# Patient Record
Sex: MALE | Race: BLACK OR AFRICAN AMERICAN | NOT HISPANIC OR LATINO
[De-identification: names, ages, dates, MRNs, and addresses within clinical notes are randomized per-mention and may not be internally consistent; named-entity substitution may affect disease eponyms.]

---

## 2017-07-24 ENCOUNTER — APPOINTMENT (OUTPATIENT)
Dept: INTERNAL MEDICINE | Facility: CLINIC | Age: 57
End: 2017-07-24

## 2017-07-24 VITALS
TEMPERATURE: 97.9 F | OXYGEN SATURATION: 98 % | SYSTOLIC BLOOD PRESSURE: 118 MMHG | WEIGHT: 217 LBS | HEART RATE: 52 BPM | RESPIRATION RATE: 12 BRPM | DIASTOLIC BLOOD PRESSURE: 76 MMHG

## 2017-07-24 DIAGNOSIS — Z78.9 OTHER SPECIFIED HEALTH STATUS: ICD-10-CM

## 2017-07-24 DIAGNOSIS — Z83.3 FAMILY HISTORY OF DIABETES MELLITUS: ICD-10-CM

## 2017-07-24 DIAGNOSIS — Z82.49 FAMILY HISTORY OF ISCHEMIC HEART DISEASE AND OTHER DISEASES OF THE CIRCULATORY SYSTEM: ICD-10-CM

## 2017-07-25 LAB
ALBUMIN SERPL ELPH-MCNC: 4.3 G/DL
ALP BLD-CCNC: 51 U/L
ALT SERPL-CCNC: 13 U/L
ANION GAP SERPL CALC-SCNC: 14 MMOL/L
APPEARANCE: CLEAR
AST SERPL-CCNC: 21 U/L
BACTERIA: NEGATIVE
BASOPHILS # BLD AUTO: 0.02 K/UL
BASOPHILS NFR BLD AUTO: 0.5 %
BILIRUB SERPL-MCNC: 0.4 MG/DL
BILIRUBIN URINE: NEGATIVE
BLOOD URINE: ABNORMAL
BUN SERPL-MCNC: 14 MG/DL
CALCIUM SERPL-MCNC: 10 MG/DL
CHLORIDE SERPL-SCNC: 103 MMOL/L
CHOLEST SERPL-MCNC: 221 MG/DL
CHOLEST/HDLC SERPL: 3.5 RATIO
CO2 SERPL-SCNC: 26 MMOL/L
COLOR: YELLOW
CREAT SERPL-MCNC: 1.16 MG/DL
EOSINOPHIL # BLD AUTO: 0.16 K/UL
EOSINOPHIL NFR BLD AUTO: 4.2 %
GLUCOSE QUALITATIVE U: NORMAL MG/DL
GLUCOSE SERPL-MCNC: 94 MG/DL
HBA1C MFR BLD HPLC: 5.9 %
HCT VFR BLD CALC: 41 %
HDLC SERPL-MCNC: 64 MG/DL
HGB BLD-MCNC: 13.1 G/DL
HYALINE CASTS: 0 /LPF
IMM GRANULOCYTES NFR BLD AUTO: 0.3 %
KETONES URINE: NEGATIVE
LDLC SERPL CALC-MCNC: 143 MG/DL
LEUKOCYTE ESTERASE URINE: NEGATIVE
LYMPHOCYTES # BLD AUTO: 1.68 K/UL
LYMPHOCYTES NFR BLD AUTO: 43.9 %
MAN DIFF?: NORMAL
MCHC RBC-ENTMCNC: 28.1 PG
MCHC RBC-ENTMCNC: 32 GM/DL
MCV RBC AUTO: 87.8 FL
MICROSCOPIC-UA: NORMAL
MONOCYTES # BLD AUTO: 0.28 K/UL
MONOCYTES NFR BLD AUTO: 7.3 %
NEUTROPHILS # BLD AUTO: 1.68 K/UL
NEUTROPHILS NFR BLD AUTO: 43.8 %
NITRITE URINE: NEGATIVE
PH URINE: 5.5
PLATELET # BLD AUTO: 127 K/UL
POTASSIUM SERPL-SCNC: 4.4 MMOL/L
PROT SERPL-MCNC: 7.4 G/DL
PROTEIN URINE: NEGATIVE MG/DL
PSA SERPL-MCNC: 0.89 NG/ML
RBC # BLD: 4.67 M/UL
RBC # FLD: 15 %
RED BLOOD CELLS URINE: 1 /HPF
SODIUM SERPL-SCNC: 143 MMOL/L
SPECIFIC GRAVITY URINE: 1.01
SQUAMOUS EPITHELIAL CELLS: 0 /HPF
TRIGL SERPL-MCNC: 71 MG/DL
TSH SERPL-ACNC: 1.49 UIU/ML
UROBILINOGEN URINE: NORMAL MG/DL
WBC # FLD AUTO: 3.83 K/UL
WHITE BLOOD CELLS URINE: 0 /HPF

## 2017-08-15 ENCOUNTER — FORM ENCOUNTER (OUTPATIENT)
Age: 57
End: 2017-08-15

## 2017-08-16 ENCOUNTER — OUTPATIENT (OUTPATIENT)
Dept: OUTPATIENT SERVICES | Facility: HOSPITAL | Age: 57
LOS: 1 days | End: 2017-08-16
Payer: COMMERCIAL

## 2017-08-16 ENCOUNTER — APPOINTMENT (OUTPATIENT)
Dept: ORTHOPEDIC SURGERY | Facility: CLINIC | Age: 57
End: 2017-08-16
Payer: COMMERCIAL

## 2017-08-16 DIAGNOSIS — M25.562 PAIN IN LEFT KNEE: ICD-10-CM

## 2017-08-16 PROCEDURE — 73564 X-RAY EXAM KNEE 4 OR MORE: CPT

## 2017-08-16 PROCEDURE — 73564 X-RAY EXAM KNEE 4 OR MORE: CPT | Mod: 26,LT

## 2017-08-16 PROCEDURE — 99204 OFFICE O/P NEW MOD 45 MIN: CPT

## 2017-08-17 ENCOUNTER — FORM ENCOUNTER (OUTPATIENT)
Age: 57
End: 2017-08-17

## 2017-08-18 ENCOUNTER — APPOINTMENT (OUTPATIENT)
Dept: ORTHOPEDIC SURGERY | Facility: CLINIC | Age: 57
End: 2017-08-18
Payer: COMMERCIAL

## 2017-08-18 ENCOUNTER — OUTPATIENT (OUTPATIENT)
Dept: OUTPATIENT SERVICES | Facility: HOSPITAL | Age: 57
LOS: 1 days | End: 2017-08-18
Payer: COMMERCIAL

## 2017-08-18 DIAGNOSIS — M25.571 PAIN IN RIGHT ANKLE AND JOINTS OF RIGHT FOOT: ICD-10-CM

## 2017-08-18 DIAGNOSIS — M77.51 OTHER ENTHESOPATHY OF RT FOOT AND ANKLE: ICD-10-CM

## 2017-08-18 PROCEDURE — 99214 OFFICE O/P EST MOD 30 MIN: CPT

## 2017-08-18 PROCEDURE — 73610 X-RAY EXAM OF ANKLE: CPT

## 2017-08-18 PROCEDURE — 73610 X-RAY EXAM OF ANKLE: CPT | Mod: 26,RT

## 2017-08-25 ENCOUNTER — TRANSCRIPTION ENCOUNTER (OUTPATIENT)
Age: 57
End: 2017-08-25

## 2017-09-08 ENCOUNTER — APPOINTMENT (OUTPATIENT)
Dept: ORTHOPEDIC SURGERY | Facility: CLINIC | Age: 57
End: 2017-09-08

## 2017-11-08 ENCOUNTER — APPOINTMENT (OUTPATIENT)
Dept: GASTROENTEROLOGY | Facility: CLINIC | Age: 57
End: 2017-11-08
Payer: COMMERCIAL

## 2017-11-08 ENCOUNTER — LABORATORY RESULT (OUTPATIENT)
Age: 57
End: 2017-11-08

## 2017-11-08 VITALS
DIASTOLIC BLOOD PRESSURE: 86 MMHG | SYSTOLIC BLOOD PRESSURE: 122 MMHG | WEIGHT: 213 LBS | RESPIRATION RATE: 14 BRPM | HEART RATE: 52 BPM | BODY MASS INDEX: 28.85 KG/M2 | OXYGEN SATURATION: 98 % | HEIGHT: 72 IN | TEMPERATURE: 98.4 F

## 2017-11-08 PROCEDURE — 99243 OFF/OP CNSLTJ NEW/EST LOW 30: CPT | Mod: 25,GC

## 2017-11-08 PROCEDURE — 36415 COLL VENOUS BLD VENIPUNCTURE: CPT | Mod: GC

## 2017-11-09 LAB
ALBUMIN SERPL ELPH-MCNC: 4.4 G/DL
ALP BLD-CCNC: 44 U/L
ALT SERPL-CCNC: 10 U/L
ANION GAP SERPL CALC-SCNC: 14 MMOL/L
AST SERPL-CCNC: 28 U/L
BASOPHILS # BLD AUTO: 0.01 K/UL
BASOPHILS NFR BLD AUTO: 0.3 %
BILIRUB SERPL-MCNC: 0.3 MG/DL
BUN SERPL-MCNC: 14 MG/DL
CALCIUM SERPL-MCNC: 9.6 MG/DL
CHLORIDE SERPL-SCNC: 102 MMOL/L
CO2 SERPL-SCNC: 27 MMOL/L
CREAT SERPL-MCNC: 1.21 MG/DL
EOSINOPHIL # BLD AUTO: 0.17 K/UL
EOSINOPHIL NFR BLD AUTO: 4.4 %
GLUCOSE SERPL-MCNC: 103 MG/DL
HCT VFR BLD CALC: 43.5 %
HGB BLD-MCNC: 14.2 G/DL
IMM GRANULOCYTES NFR BLD AUTO: 0 %
LYMPHOCYTES # BLD AUTO: 1.47 K/UL
LYMPHOCYTES NFR BLD AUTO: 38.1 %
MAN DIFF?: NORMAL
MCHC RBC-ENTMCNC: 28.9 PG
MCHC RBC-ENTMCNC: 32.6 GM/DL
MCV RBC AUTO: 88.4 FL
MONOCYTES # BLD AUTO: 0.34 K/UL
MONOCYTES NFR BLD AUTO: 8.8 %
NEUTROPHILS # BLD AUTO: 1.87 K/UL
NEUTROPHILS NFR BLD AUTO: 48.4 %
PLATELET # BLD AUTO: 115 K/UL
POTASSIUM SERPL-SCNC: 4.2 MMOL/L
PROT SERPL-MCNC: 7.8 G/DL
RBC # BLD: 4.92 M/UL
RBC # FLD: 14.9 %
SODIUM SERPL-SCNC: 143 MMOL/L
WBC # FLD AUTO: 3.86 K/UL

## 2017-12-05 ENCOUNTER — OTHER (OUTPATIENT)
Age: 57
End: 2017-12-05

## 2017-12-21 ENCOUNTER — RESULT REVIEW (OUTPATIENT)
Age: 57
End: 2017-12-21

## 2017-12-21 ENCOUNTER — APPOINTMENT (OUTPATIENT)
Dept: GASTROENTEROLOGY | Facility: HOSPITAL | Age: 57
End: 2017-12-21

## 2017-12-21 ENCOUNTER — OUTPATIENT (OUTPATIENT)
Dept: OUTPATIENT SERVICES | Facility: HOSPITAL | Age: 57
LOS: 1 days | Discharge: HOME | End: 2017-12-21
Payer: COMMERCIAL

## 2017-12-21 PROCEDURE — 45385 COLONOSCOPY W/LESION REMOVAL: CPT | Mod: PT

## 2017-12-21 PROCEDURE — 45385 COLONOSCOPY W/LESION REMOVAL: CPT

## 2017-12-21 PROCEDURE — 45380 COLONOSCOPY AND BIOPSY: CPT | Mod: PT,XS

## 2017-12-21 PROCEDURE — C1889: CPT

## 2017-12-21 PROCEDURE — 88305 TISSUE EXAM BY PATHOLOGIST: CPT

## 2017-12-22 LAB — SURGICAL PATHOLOGY STUDY: SIGNIFICANT CHANGE UP

## 2018-01-02 ENCOUNTER — MESSAGE (OUTPATIENT)
Age: 58
End: 2018-01-02

## 2022-05-02 ENCOUNTER — APPOINTMENT (OUTPATIENT)
Dept: ORTHOPEDIC SURGERY | Facility: CLINIC | Age: 62
End: 2022-05-02
Payer: MEDICAID

## 2022-05-02 VITALS — BODY MASS INDEX: 28.85 KG/M2 | RESPIRATION RATE: 16 BRPM | WEIGHT: 213 LBS | HEIGHT: 72 IN

## 2022-05-02 DIAGNOSIS — Q68.2 CONGENITAL DEFORMITY OF KNEE: ICD-10-CM

## 2022-05-02 DIAGNOSIS — M17.12 UNILATERAL PRIMARY OSTEOARTHRITIS, LEFT KNEE: ICD-10-CM

## 2022-05-02 DIAGNOSIS — Z78.9 OTHER SPECIFIED HEALTH STATUS: ICD-10-CM

## 2022-05-02 PROCEDURE — 73564 X-RAY EXAM KNEE 4 OR MORE: CPT | Mod: LT

## 2022-05-02 PROCEDURE — 99203 OFFICE O/P NEW LOW 30 MIN: CPT

## 2022-05-02 RX ORDER — IBUPROFEN 800 MG/1
800 TABLET, FILM COATED ORAL
Qty: 16 | Refills: 0 | Status: ACTIVE | COMMUNITY
Start: 2022-04-21

## 2022-08-11 ENCOUNTER — EMERGENCY (EMERGENCY)
Facility: HOSPITAL | Age: 62
LOS: 1 days | Discharge: HOME | End: 2022-08-11
Admitting: EMERGENCY MEDICINE
Payer: COMMERCIAL

## 2022-08-11 VITALS
WEIGHT: 210.1 LBS | HEART RATE: 55 BPM | TEMPERATURE: 99 F | DIASTOLIC BLOOD PRESSURE: 70 MMHG | OXYGEN SATURATION: 95 % | SYSTOLIC BLOOD PRESSURE: 121 MMHG | RESPIRATION RATE: 18 BRPM

## 2022-08-11 LAB
APPEARANCE UR: CLEAR — SIGNIFICANT CHANGE UP
BILIRUB UR-MCNC: NEGATIVE — SIGNIFICANT CHANGE UP
COLOR SPEC: YELLOW — SIGNIFICANT CHANGE UP
DIFF PNL FLD: NEGATIVE — SIGNIFICANT CHANGE UP
GLUCOSE UR QL: NEGATIVE — SIGNIFICANT CHANGE UP
KETONES UR-MCNC: NEGATIVE — SIGNIFICANT CHANGE UP
LEUKOCYTE ESTERASE UR-ACNC: NEGATIVE — SIGNIFICANT CHANGE UP
NITRITE UR-MCNC: NEGATIVE — SIGNIFICANT CHANGE UP
PH UR: 6 — SIGNIFICANT CHANGE UP (ref 5–8)
PROT UR-MCNC: NEGATIVE MG/DL — SIGNIFICANT CHANGE UP
SP GR SPEC: 1.02 — SIGNIFICANT CHANGE UP (ref 1–1.03)
UROBILINOGEN FLD QL: 0.2 E.U./DL — SIGNIFICANT CHANGE UP

## 2022-08-11 PROCEDURE — 99283 EMERGENCY DEPT VISIT LOW MDM: CPT

## 2022-08-11 PROCEDURE — 81003 URINALYSIS AUTO W/O SCOPE: CPT

## 2022-08-11 PROCEDURE — 99284 EMERGENCY DEPT VISIT MOD MDM: CPT

## 2022-08-11 RX ORDER — LIDOCAINE 4 G/100G
1 CREAM TOPICAL ONCE
Refills: 0 | Status: COMPLETED | OUTPATIENT
Start: 2022-08-11 | End: 2022-08-11

## 2022-08-11 RX ORDER — DICLOFENAC SODIUM 75 MG/1
1 TABLET, DELAYED RELEASE ORAL
Qty: 15 | Refills: 0
Start: 2022-08-11 | End: 2022-08-15

## 2022-08-11 RX ORDER — CYCLOBENZAPRINE HYDROCHLORIDE 10 MG/1
10 TABLET, FILM COATED ORAL ONCE
Refills: 0 | Status: COMPLETED | OUTPATIENT
Start: 2022-08-11 | End: 2022-08-11

## 2022-08-11 RX ORDER — CYCLOBENZAPRINE HYDROCHLORIDE 10 MG/1
1 TABLET, FILM COATED ORAL
Qty: 15 | Refills: 0
Start: 2022-08-11 | End: 2022-08-15

## 2022-08-11 RX ORDER — LIDOCAINE 4 G/100G
1 CREAM TOPICAL
Qty: 7 | Refills: 0
Start: 2022-08-11 | End: 2022-08-17

## 2022-08-11 RX ADMIN — LIDOCAINE 1 PATCH: 4 CREAM TOPICAL at 18:54

## 2022-08-11 RX ADMIN — CYCLOBENZAPRINE HYDROCHLORIDE 10 MILLIGRAM(S): 10 TABLET, FILM COATED ORAL at 18:54

## 2022-08-11 NOTE — ED PROVIDER NOTE - MUSCULOSKELETAL, MLM
no discolorations, no rash, no spinal tend, FROM, + discomfort over L para upper lumbar area, normal gait, UE/LE w/FROM x 4

## 2022-08-11 NOTE — ED PROVIDER NOTE - PATIENT PORTAL LINK FT
You can access the FollowMyHealth Patient Portal offered by [facility] by registering at the following website: [email or web address]. By joining [employer]’s FollowMyHealth portal, you will also be able to view your health information using other applications (apps) compatible with our system.

## 2022-08-11 NOTE — ED PROVIDER NOTE - CLINICAL SUMMARY MEDICAL DECISION MAKING FREE TEXT BOX
pt c/o l sided back pain s/p martial arts class, states that pain is improving, but still having soreness - worse w/certain mov and positions, no blunt trauma or fall, no spinal tend, no concern for cord compression or cauda equina, not renal colic by hx, suspect msk / spasm, will rx muscle relaxants and nsaids and lidoderm, to rest and f/u w/pmd, no emergent indication for any imaging at this time, pt understands and agrees w/plan, strict return precautions given

## 2022-08-11 NOTE — ED PROVIDER NOTE - NSFOLLOWUPINSTRUCTIONS_ED_ALL_ED_FT
Back Pain  rest, avoid training, heavy lifting/pushing/pulling, take medication as discussed, follow up with your pmd  Back pain is very common in adults. The cause of back pain is rarely dangerous and the pain often gets better over time. The cause of your back pain may not be known and may include strain of muscles or ligaments, degeneration of the spinal disks, or arthritis. Occasionally the pain may radiate down your leg(s). Over-the-counter medicines to reduce pain and inflammation are often the most helpful. Stretching and remaining active frequently helps the healing process.     SEEK IMMEDIATE MEDICAL CARE IF YOU HAVE ANY OF THE FOLLOWING SYMPTOMS: bowel or bladder control problems, unusual weakness or numbness in your arms or legs, nausea or vomiting, abdominal pain, fever, dizziness/lightheadedness.

## 2022-08-11 NOTE — ED PROVIDER NOTE - OBJECTIVE STATEMENT
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 Pain to L mid back, 4/10, aggravated w/mov and certain positions, took an ibuprofen w/good relief. Denies blunt trauma, fall, numbness or tingling to fingers or toes, loss of bowel or bladder control, fevers, chills, cp, sob, n/v/d, abd pain .

## 2022-08-11 NOTE — ED ADULT NURSE NOTE - OBJECTIVE STATEMENT
Pt A&Ox4, ambulatory with steady gait, speaking in clear/full sentences, no acute distress, vital signs stable. Pt presents to the ED for left sided back pain x 3 days. Pain began after exercise and pt felt like the pain was "gas." Pt wants to ensure that back pain is not related to kidneys. No CVA tenderness, no burning urination, or dysuria. No numbness/tingling or loss of control of bowel/bladder.

## 2022-08-11 NOTE — ED ADULT TRIAGE NOTE - CHIEF COMPLAINT QUOTE
Patient c/o left lower back pain that started after [employer] class 1 week ago.  Denies urinary symptoms, numbness, incontinence.

## 2022-08-15 DIAGNOSIS — M54.6 PAIN IN THORACIC SPINE: ICD-10-CM

## 2022-08-15 DIAGNOSIS — M54.50 LOW BACK PAIN, UNSPECIFIED: ICD-10-CM
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
## 2023-01-10 ENCOUNTER — EMERGENCY (EMERGENCY)
Facility: HOSPITAL | Age: 63
LOS: 1 days | Discharge: HOME | End: 2023-01-10
Attending: STUDENT IN AN ORGANIZED HEALTH CARE EDUCATION/TRAINING PROGRAM | Admitting: STUDENT IN AN ORGANIZED HEALTH CARE EDUCATION/TRAINING PROGRAM
Payer: COMMERCIAL

## 2023-01-10 VITALS
HEIGHT: 72 IN | DIASTOLIC BLOOD PRESSURE: 64 MMHG | OXYGEN SATURATION: 97 % | TEMPERATURE: 98 F | RESPIRATION RATE: 18 BRPM | WEIGHT: 210.1 LBS | HEART RATE: 58 BPM | SYSTOLIC BLOOD PRESSURE: 129 MMHG

## 2023-01-10 PROCEDURE — 99284 EMERGENCY DEPT VISIT MOD MDM: CPT

## 2023-01-10 PROCEDURE — 99283 EMERGENCY DEPT VISIT LOW MDM: CPT

## 2023-01-10 RX ORDER — CIPROFLOXACIN AND DEXAMETHASONE 3; 1 MG/ML; MG/ML
4 SUSPENSION/ DROPS AURICULAR (OTIC)
Qty: 1 | Refills: 0
Start: 2023-01-10

## 2023-01-10 NOTE — ED PROVIDER NOTE - CLINICAL SUMMARY MEDICAL DECISION MAKING FREE TEXT BOX
56 yo M presenting with L ear fullness, itchiness, exam with mild erythema of auditory canal with debris but otherwise nonfocal, TM intact, nonbulging, no erythema, no mastoid tenderness. Will treat for possible otitis externa with ENT follow up.

## 2023-01-10 NOTE — ED PROVIDER NOTE - NSFOLLOWUPINSTRUCTIONS_ED_ALL_ED_FT
Please use ear drops as prescribed. Please return for worsening symptoms. Please follow up with the ENT doctor as needed.

## 2023-01-10 NOTE — ED PROVIDER NOTE - NS ED ROS FT
Constitutional: No fever or chills  Eyes: No discharge or drainage  Ears, Nose, Mouth, Throat: No nasal discharge, no sore throat, + L ear fullness and itchines  Cardiovascular: No chest pain, no palpitations  Respiratory: No shortness of breath, no cough  Gastrointestinal: No nausea or vomiting, no abdominal pain, no diarrhea or constipation  Musculoskeletal: No joint pain, no swelling  Skin: No rashes or lesions  Neurological: No numbness, weakness, tingling, no headache

## 2023-01-10 NOTE — ED PROVIDER NOTE - OBJECTIVE STATEMENT
62-year-old male with no significant past medical history presenting with left ear fullness and decreased hearing times several days.  Patient states he had a viral URI 2 weeks ago that resolved.  Patient states his ear feels full, also itchy.  Denies fever or chills, denies discharge or drainage, denies trauma, denies rhinorrhea or nasal congestion, denies cough, denies headache, no other complaints.  Has not taken anything for symptoms.  ROS as above.

## 2023-01-10 NOTE — ED ADULT NURSE NOTE - OBJECTIVE STATEMENT
Pt received aaox3 c/o left ear fullness and decreased hearing times several days. Patient states he had a viral URI 2 weeks ago that resolved.  Patient states his ear feels full, also itchy. Denies fever or chills, denies discharge or drainage, denies trauma, denies rhinorrhea or nasal congestion, denies cough, denies headache, no other complaints.

## 2023-01-10 NOTE — ED PROVIDER NOTE - PATIENT PORTAL LINK FT
You can access the FollowMyHealth Patient Portal offered by [facility] by registering at the following website: [email or web address]. By joining [employer]’s FollowMyHealth portal, you will also be able to view your health information using other applications (apps) compatible with our system.

## 2023-01-10 NOTE — ED PROVIDER NOTE - PHYSICAL EXAMINATION
General: Well appearing, in no acute distress  HEENT: Normocephalic, atraumatic, extraocular movements intact, R TM nonerythematous, L TM nonerythematous, L external auditory canal with debris, mild erythema, no discharge or drainage, no mastoid tenderness  CV: Regular rate  Pulm: No respiratory distress, no tachpynea  Abd: Flat, no gross distension  Ext: warm and well perfused  Skin: No gross rashes or lesions  Neuro: Alert and oriented, moving all extremities

## 2023-01-11 DIAGNOSIS — L29.9 PRURITUS, UNSPECIFIED: ICD-10-CM

## 2023-01-11 DIAGNOSIS — H60.92 UNSPECIFIED OTITIS EXTERNA, LEFT EAR: ICD-10-CM

## 2023-01-11 DIAGNOSIS — Z88.0 ALLERGY STATUS TO PENICILLIN: ICD-10-CM

## 2023-01-21 ENCOUNTER — EMERGENCY (EMERGENCY)
Facility: HOSPITAL | Age: 63
LOS: 1 days | Discharge: HOME | End: 2023-01-21
Attending: EMERGENCY MEDICINE | Admitting: EMERGENCY MEDICINE
Payer: COMMERCIAL

## 2023-01-21 VITALS
SYSTOLIC BLOOD PRESSURE: 138 MMHG | HEART RATE: 71 BPM | HEIGHT: 72 IN | DIASTOLIC BLOOD PRESSURE: 72 MMHG | TEMPERATURE: 98 F | RESPIRATION RATE: 16 BRPM | OXYGEN SATURATION: 99 % | WEIGHT: 210.1 LBS

## 2023-01-21 PROBLEM — Z78.9 OTHER SPECIFIED HEALTH STATUS: Chronic | Status: ACTIVE | Noted: 2023-01-10

## 2023-01-21 PROCEDURE — 99283 EMERGENCY DEPT VISIT LOW MDM: CPT

## 2023-01-21 RX ORDER — CLINDAMYCIN PHOSPHATE GEL USP, 1% 10 MG/G
1 GEL TOPICAL
Qty: 60 | Refills: 0
Start: 2023-01-21

## 2023-01-21 NOTE — ED PROVIDER NOTE - PATIENT PORTAL LINK FT
You can access the FollowMyHealth Patient Portal offered by [facility] by registering at the following website: [email or web address]. By joining [employer]’s FollowMyHealth portal, you will also be able to view your health information using other applications (apps) compatible with our system.

## 2023-01-21 NOTE — ED PROVIDER NOTE - CLINICAL SUMMARY MEDICAL DECISION MAKING FREE TEXT BOX
Pt c/o 1 yr intermittent sx scalp, groin, feet.  Scalp exam suggestive of folliculitis - will try topical abx.  Groin/feet c/w tinea - will try antifungal.  Recommend pt fu w derm.

## 2023-01-21 NOTE — ED ADULT NURSE NOTE - CHPI ED NUR SYMPTOMS NEG
no body aches/no chills/no confusion/no decreased eating/drinking/no fever/no inflammation/no pain/no petechia/no vomiting

## 2023-01-21 NOTE — ED ADULT TRIAGE NOTE - ADDITIONAL COMPLAINTS
[Takes medication as prescribed] : takes [None] : Patient does not have any barriers to medication adherence [Yes] : Reviewed medication list for presence of high-risk medications. [Other____] : [unfilled] Additional Complaints

## 2023-01-21 NOTE — ED PROVIDER NOTE - NSFOLLOWUPINSTRUCTIONS_ED_ALL_ED_FT
Folliculitis  Tinea pedis and cruris    Try clindamycin solution twice a day on your scalp lesions when they occur.    Use the antifungal medication twice a day EVERY day on affected groin and feet areas; make sure to get between your toes.  Wear clean, dry socks.     Follow up with dermatology.  You may call our referrals coordinator at [phone] Monday to Friday 11am-7pm for assistance with making an appointment     Tinea    Tinea is an infection of the skin caused by funguses. It can present in various areas of the body including the head (tinea capitis), body (tinea corporis or ringworm), groin (tinea cruris or jock itch), and foot (tinea pedis or athlete’s foot). Symptoms include an itchy red rash that may be ring-shaped and have central clearing and scales. Treatment may involve an antifungal cream or medicines by mouth.     SEEK IMMEDIATE MEDICAL CARE IF YOU HAVE ANY OF THE FOLLOWING SYMPTOMS: rash continues to spread, rash lasts over 4 weeks, or worsening warmth, tenderness, or swelling.  ---    Folliculitis    A normal hair follicle compared to one that is infected. The infected follicle is swollen and contains pus.   Folliculitis is inflammation of the hair follicles. Folliculitis most commonly occurs on the scalp, thighs, legs, back, and buttocks. However, it can occur anywhere on the body.      What are the causes?    This condition may be caused by:  •A bacterial infection (common).      •A fungal infection.      •A viral infection.      •Contact with certain chemicals, especially oils and tars.      •Shaving or waxing.      •Greasy ointments or creams applied to the skin.      Long-lasting folliculitis and folliculitis that keeps coming back may be caused by bacteria. This bacteria can live anywhere on your skin and is often found in the nostrils.      What increases the risk?    You are more likely to develop this condition if you have:  •A weakened immune system.      •Diabetes.      •Obesity.        What are the signs or symptoms?    Symptoms of this condition include:  •Redness.      •Soreness.      •Swelling.      •Itching.      •Small white or yellow, pus-filled, itchy spots (pustules) that appear over a reddened area. If there is an infection that goes deep into the follicle, these may develop into a boil (furuncle).      •A group of closely packed boils (carbuncle). These tend to form in hairy, sweaty areas of the body.        How is this diagnosed?    This condition is diagnosed with a skin exam. To find what is causing the condition, your health care provider may take a sample of one of the pustules or boils for testing in a lab.      How is this treated?    This condition may be treated by:  •Applying warm compresses to the affected areas.      •Taking an antibiotic medicine or applying an antibiotic medicine to the skin.      •Applying or bathing with an antiseptic solution.      •Taking an over-the-counter medicine to help with itching.      •Having a procedure to drain any pustules or boils. This may be done if a pustule or boil contains a lot of pus or fluid.      •Having laser hair removal. This may be done to treat long-lasting folliculitis.        Follow these instructions at home:      Managing pain and swelling   A heating pad for use on the affected area. •If directed, apply heat to the affected area as often as told by your health care provider. Use the heat source that your health care provider recommends, such as a moist heat pack or a heating pad.  •Place a towel between your skin and the heat source.      •Leave the heat on for 20–30 minutes.      •Remove the heat if your skin turns bright red. This is especially important if you are unable to feel pain, heat, or cold. You may have a greater risk of getting burned.        General instructions     •If you were prescribed an antibiotic medicine, take it or apply it as told by your health care provider. Do not stop using the antibiotic even if your condition improves.    •Check the irritated area every day for signs of infection. Check for:  •Redness, swelling, or pain.      •Fluid or blood.      •Warmth.      •Pus or a bad smell.        • Do not shave irritated skin.      •Take over-the-counter and prescription medicines only as told by your health care provider.      •Keep all follow-up visits as told by your health care provider. This is important.        Get help right away if:    •You have more redness, swelling, or pain in the affected area.      •Red streaks are spreading from the affected area.      •You have a fever.        Summary    •Folliculitis is inflammation of the hair follicles. Folliculitis most commonly occurs on the scalp, thighs, legs, back, and buttocks.      •This condition may be treated by taking an antibiotic medicine or applying an antibiotic medicine to the skin, and applying or bathing with an antiseptic solution.      •If you were prescribed an antibiotic medicine, take it or apply it as told by your health care provider. Do not stop using the antibiotic even if your condition improves.      •Get help right away if you have new or worsening symptoms.      •Keep all follow-up visits as told by your health care provider. This is important.

## 2023-01-21 NOTE — ED PROVIDER NOTE - OBJECTIVE STATEMENT
61 yo M c/o 1 yr intermittent itching in groin and bilat feet as well as lesions on his scalp.  Scalp lesions are itchy and have white material that he sometimes expresses and then they form a small wound that resolves.  No pain.  Pt used to have similar on his face but this stopped when he grew a beard.  Pt notes itching in groin/bilat feet; does have some improvement w lotrimin spray but does not use regularly.  No fever.  Pt has not seen derm for sx.

## 2023-01-21 NOTE — ED ADULT NURSE NOTE - OBJECTIVE STATEMENT
Patient came to ER with c/o rash to groin, feet and scalp that has been intermittent for about 1 year.

## 2023-01-21 NOTE — ED PROVIDER NOTE - PHYSICAL EXAMINATION
VITAL SIGNS: I have reviewed nursing notes and confirm.  CONSTITUTIONAL:  in no acute distress.   SKIN:  warm and dry, no acute rash.   HEAD:  normocephalic, atraumatic.  R scalp w 3 scattered 3 mm small wounds w/o ttp, erythema, warmth, drainage  EYES:  conjunctiva and sclera clear.  ENT: No nasal discharge; airway clear.   NECK: Supple; non tender.   RESP:  nl resp effort   SKIN: groin area w dry appearing skin, slightly hyperpigemented; bilat feet w dry patches on soles and also tinea changes bt all toes  NEURO: Alert, oriented, grossly unremarkable  PSYCH: Cooperative, mood and affect appropriate.

## 2023-01-21 NOTE — ED PROVIDER NOTE - CARE PLAN
1 Principal Discharge DX:	Folliculitis  Secondary Diagnosis:	Tinea pedis  Secondary Diagnosis:	Tinea cruris

## 2023-01-21 NOTE — ED ADULT NURSE NOTE - PAIN RATING/NUMBER SCALE (0-10): ACTIVITY
0 Ear Infection:  Take full course of antibiotics as prescribed.  Humidifier use at home.  Warm compresses to affected ear  Elevate head on a pillow at night   Flonase Nasal Spray as directed for nasal congestion  Over the Counter Claritin or Zyrtec (plain) as directed for allergy symptoms  Tylenol or Motrin every 4 - 6 hours as needed for fever or ear pain.  Follow up with your PCP in 1 week of initiating antibiotics or sooner for no improvement in symptoms  Follow up in the ER for any worsening of symptoms such as new fever, increasing ear pain, neck stiffness, shortness of breath, etc.  If you smoke, stop smoking.                                                            Pharyngitis   If your condition worsens or fails to improve we recommend that you receive another evaluation at the ER immediately or contact your PCP to discuss your concerns or return here. You must understand that you've received an urgent care treatment only and that you may be released before all your medical problems are known or treated. You the patient will arrange for followup care as instructed.   The majority of all sore throats or tonsillitis are viral and antibiotics will not treat this.     If the strep test performed in office was Positive:  - Complete the full course of antibiotics given  - Drink plenty of cool liquids while avoid any beverage or food that can irritate your throat (acidic, spicy or salty foods).  - Throw away your toothbrush now and when you complete your antibiotics.    If the strep culture was done and returns negative in 3-5 days and you are still having a sore throat, you may need to get a mono spot test done or repeated.   Tylenol or ibuprofen for pain may help as long as you are not allergic to these meds or have a medical condition such as stomach ulcers, liver or kidney disease or taking blood thinners etc that would   prevent you from using these medications.   Rest and fluids will help as well.   If you  were prescribed antibiotics and are female and on BCP use additional methods to prevent pregnancy while on the antibiotics and for one cycle after.     STD testing  You were tested for the following STDs on today: Gonorrhea and Chlamydia   As far the STD testing, we may hold off on any medications till the labs result. We will phone in medication for you if needed.  If we have decided to treat you now be sure to take all the meds as directed.  If you need more meds when the labs result we will call you and phone them in for you.  If you do test positive for STDs you should be retested in about 6 weeks again in 6 monts to ensure true negative results.    Increase condom use to prevent further occurance.  Notify sexual partners of the need for testing.  Complete ALL medication prescribed.  NO sexual intercourse for 7 days after treatment.      Today's testing will give no crediable information if you have unprotected sexual activities going forward.     Middle Ear Infection (Adult)  You have an infection of the middle ear, the space behind the eardrum. This is also called acute otitis media (AOM). Sometimes it is caused by the common cold. This is because congestion can block the internal passage (eustachian tube) that drains fluid from the middle ear. When the middle ear fills with fluid, bacteria can grow there and cause an infection. Oral antibiotics are used to treat this illness, not ear drops. Symptoms usually start to improve within 1 to 2 days of treatment.    Home care  The following are general care guidelines:  · Finish all of the antibiotic medicine given, even though you may feel better after the first few days.  · You may use over-the-counter medicine, such as acetaminophen or ibuprofen, to control pain and fever, unless something else was prescribed. If you have chronic liver or kidney disease or have ever had a stomach ulcer or gastrointestinal bleeding, talk with your healthcare provider before using  these medicines. Do not give aspirin to anyone under 18 years of age who has a fever. It may cause severe illness or death.  Follow-up care  Follow up with your healthcare provider, or as advised, in 2 weeks if all symptoms have not gotten better, or if hearing doesn't go back to normal within 1 month.  When to seek medical advice  Call your healthcare provider right away if any of these occur:  · Ear pain gets worse or does not improve after 3 days of treatment  · Unusual drowsiness or confusion  · Neck pain, stiff neck, or headache  · Fluid or blood draining from the ear canal  · Fever of 100.4°F (38°C) or as advised   · Seizure  Date Last Reviewed: 6/1/2016 © 2000-2017 [employer]. [address] [place] [address], [place] [address]. All rights reserved. This information is not intended as a substitute for professional medical care. Always follow your healthcare professional's instructions.        Pharyngitis (Sore Throat), Report Pending    Pharyngitis (sore throat) is often due to a virus. It can also be caused by the streptococcus, or strep, bacterium, often called strep throat. Both viral and strep infections can cause throat pain that is worse when swallowing, aching all over with headache, and fever. Both types of infections are contagious. They may be spread by coughing, kissing, or touching others after touching your mouth or nose.  A test has been done to find out whether you (or your child, if your child is the patient) have strep throat. Call this facility or your healthcare provider if you were not given your test results. If the test is positive for strep infection, you will need to take antibiotic medicines. A prescription can be called into your pharmacy at that time. If the test is negative, you probably have a viral pharyngitis. This does not need to be treated with antibiotics. Until you receive the results of the strep test, you should stay home from work. If your child is being tested, he  or she should stay home from school.  Home care  · Rest at home. Drink plenty of fluids so you won't get dehydrated.  · If the test is positive for strep, don't go to work or school for the first 2 days of taking the antibiotics. After this time, you will not be contagious. You can then return to work or school if you are feeling better.   · Take the antibiotic medicine for the full 10 days, even if you feel better. This is very important to make sure the infection is treated. It is also important to prevent drug-resistant germs from developing. If you were given an antibiotic shot, you won't need more antibiotics.  · For children: Use acetaminophen for fever, fussiness, or discomfort. In infants older than 6 months of age, you may use ibuprofen instead of acetaminophen. Talk with your child's healthcare provider before giving these medicines if your child has chronic liver or kidney disease or ever had a stomach ulcer or GI bleeding. Never give aspirin to a child under 18 years of age who is ill with a fever. It may cause severe liver damage.  · For adults: Use acetaminophen or ibuprofen to control pain or fever, unless another medicine was prescribed for this. Talk with your healthcare provider before taking these medicines if you have chronic liver or kidney disease or ever had a stomach ulcer or GI bleeding.  · Use throat lozenges or numbing throat sprays to help reduce pain. Gargling with warm salt water will also help reduce throat pain. For this, dissolve 1/2 teaspoon of salt in 1 glass of warm water. To help soothe a sore throat, children can sip on juice or a popsicle. Children 5 years and older can also suck on a lollipop or hard candy.  · Don't eat salty or spicy foods. These can irritate the throat.  Follow-up care  Follow up with your healthcare provider or our staff if you don't get better over the next week.  When to seek medical advice  Call your healthcare provider right away if any of these  occur:  · Fever as directed by your healthcare provider. For children, seek care if:  ¨ Your child is of any age and has repeated fevers above 104°F (40°C).  ¨ Your child is younger than 2 years of age and has a fever of 100.4°F (38°C) that continues for more than 1 day.  ¨ Your child is 2 years old or older and has a fever of 100.4°F (38°C) that continues for more than 3 days.  · New or worsening ear pain, sinus pain, or headache  · Painful lumps in the back of neck  · Stiff neck  · Lymph nodes are getting larger  · Inability to swallow liquids, excessive drooling, or inability to open mouth wide due to throat pain  · Signs of dehydration (very dark urine or no urine, sunken eyes, dizziness)  · Trouble breathing or noisy breathing  · Muffled voice  · New rash  · Child appears to be getting sicker  Date Last Reviewed: 4/13/2015  © 5068-7983 [employer]. [address] [place] [address], [place] [address]. All rights reserved. This information is not intended as a substitute for professional medical care. Always follow your healthcare professional's instructions.        Pharyngitis (Sore Throat), Report Pending    Pharyngitis (sore throat) is often due to a virus. It can also be caused by the streptococcus, or strep, bacterium, often called strep throat. Both viral and strep infections can cause throat pain that is worse when swallowing, aching all over with headache, and fever. Both types of infections are contagious. They may be spread by coughing, kissing, or touching others after touching your mouth or nose.  A test has been done to find out whether you (or your child, if your child is the patient) have strep throat. Call this facility or your healthcare provider if you were not given your test results. If the test is positive for strep infection, you will need to take antibiotic medicines. A prescription can be called into your pharmacy at that time. If the test is negative, you probably have a viral  pharyngitis. This does not need to be treated with antibiotics. Until you receive the results of the strep test, you should stay home from work. If your child is being tested, he or she should stay home from school.  Home care  · Rest at home. Drink plenty of fluids so you won't get dehydrated.  · If the test is positive for strep, don't go to work or school for the first 2 days of taking the antibiotics. After this time, you will not be contagious. You can then return to work or school if you are feeling better.   · Take the antibiotic medicine for the full 10 days, even if you feel better. This is very important to make sure the infection is treated. It is also important to prevent drug-resistant germs from developing. If you were given an antibiotic shot, you won't need more antibiotics.  · For children: Use acetaminophen for fever, fussiness, or discomfort. In infants older than 6 months of age, you may use ibuprofen instead of acetaminophen. Talk with your child's healthcare provider before giving these medicines if your child has chronic liver or kidney disease or ever had a stomach ulcer or GI bleeding. Never give aspirin to a child under 18 years of age who is ill with a fever. It may cause severe liver damage.  · For adults: Use acetaminophen or ibuprofen to control pain or fever, unless another medicine was prescribed for this. Talk with your healthcare provider before taking these medicines if you have chronic liver or kidney disease or ever had a stomach ulcer or GI bleeding.  · Use throat lozenges or numbing throat sprays to help reduce pain. Gargling with warm salt water will also help reduce throat pain. For this, dissolve 1/2 teaspoon of salt in 1 glass of warm water. To help soothe a sore throat, children can sip on juice or a popsicle. Children 5 years and older can also suck on a lollipop or hard candy.  · Don't eat salty or spicy foods. These can irritate the throat.  Follow-up care  Follow up  with your healthcare provider or our staff if you don't get better over the next week.  When to seek medical advice  Call your healthcare provider right away if any of these occur:  · Fever as directed by your healthcare provider. For children, seek care if:  ¨ Your child is of any age and has repeated fevers above 104°F (40°C).  ¨ Your child is younger than 2 years of age and has a fever of 100.4°F (38°C) that continues for more than 1 day.  ¨ Your child is 2 years old or older and has a fever of 100.4°F (38°C) that continues for more than 3 days.  · New or worsening ear pain, sinus pain, or headache  · Painful lumps in the back of neck  · Stiff neck  · Lymph nodes are getting larger  · Inability to swallow liquids, excessive drooling, or inability to open mouth wide due to throat pain  · Signs of dehydration (very dark urine or no urine, sunken eyes, dizziness)  · Trouble breathing or noisy breathing  · Muffled voice  · New rash  · Child appears to be getting sicker  Date Last Reviewed: 4/13/2015  © 3633-8435 [employer]. [address] [place] [address]. All rights reserved. This information is not intended as a substitute for professional medical care. Always follow your healthcare professional's instructions.

## 2023-01-23 ENCOUNTER — TRANSCRIPTION ENCOUNTER (OUTPATIENT)
Age: 63
End: 2023-01-23

## 2023-01-23 DIAGNOSIS — B35.6 TINEA CRURIS: ICD-10-CM

## 2023-01-23 DIAGNOSIS — B35.3 TINEA PEDIS: ICD-10-CM

## 2023-01-23 DIAGNOSIS — R21 RASH AND OTHER NONSPECIFIC SKIN ERUPTION: ICD-10-CM

## 2023-01-23 DIAGNOSIS — L73.9 FOLLICULAR DISORDER, UNSPECIFIED: ICD-10-CM

## 2023-01-23 DIAGNOSIS — Z88.0 ALLERGY STATUS TO PENICILLIN: ICD-10-CM

## 2023-02-14 ENCOUNTER — EMERGENCY (EMERGENCY)
Facility: HOSPITAL | Age: 63
LOS: 1 days | Discharge: HOME | End: 2023-02-14
Admitting: EMERGENCY MEDICINE
Payer: COMMERCIAL

## 2023-02-14 VITALS
DIASTOLIC BLOOD PRESSURE: 84 MMHG | HEART RATE: 60 BPM | SYSTOLIC BLOOD PRESSURE: 164 MMHG | HEIGHT: 72 IN | OXYGEN SATURATION: 97 % | TEMPERATURE: 98 F | RESPIRATION RATE: 18 BRPM

## 2023-02-14 PROCEDURE — 73523 X-RAY EXAM HIPS BI 5/> VIEWS: CPT

## 2023-02-14 PROCEDURE — 99284 EMERGENCY DEPT VISIT MOD MDM: CPT

## 2023-02-14 PROCEDURE — 99283 EMERGENCY DEPT VISIT LOW MDM: CPT

## 2023-02-14 PROCEDURE — 73523 X-RAY EXAM HIPS BI 5/> VIEWS: CPT | Mod: 26

## 2023-02-14 PROCEDURE — 96372 THER/PROPH/DIAG INJ SC/IM: CPT

## 2023-02-14 RX ORDER — KETOROLAC TROMETHAMINE 30 MG/ML
30 SYRINGE (ML) INJECTION ONCE
Refills: 0 | Status: DISCONTINUED | OUTPATIENT
Start: 2023-02-14 | End: 2023-02-14

## 2023-02-14 RX ORDER — IBUPROFEN 200 MG
1 TABLET ORAL
Qty: 15 | Refills: 0
Start: 2023-02-14 | End: 2023-02-18

## 2023-02-14 RX ADMIN — Medication 30 MILLIGRAM(S): at 07:53

## 2023-02-14 NOTE — ED PROVIDER NOTE - OBJECTIVE STATEMENT
62 yo M with no pmh c/o L hip pain x 4 days. 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 Pain gradually started the following day. Pain described as aching. Pt took ibuprofen which did not help. Pt then took gabapentin and naproxen with no relief. Pain better with sitting and worse when walking. Pt had similar pain before in Dec and it improved with time. Denies fever, chills, numbness, tingling, weakness.

## 2023-02-14 NOTE — ED PROVIDER NOTE - PHYSICAL EXAMINATION
CONSTITUTIONAL: Well-appearing;  in no apparent distress.   HEAD: Normocephalic; atraumatic.   EYES: PERRL; EOM intact; conjunctiva and sclera clear  ENT: normal nose; no rhinorrhea; normal pharynx with no erythema or lesions.   NECK: Supple; non-tender;   CARDIOVASCULAR: rrr,  RESPIRATORY: Breathing easily;   MSK: LLE- +tenderness to inferior hip and lateral thigh, +pain with external rotation, FROM. Strength 5/5.   EXT: No cyanosis or edema; N/V intact  SKIN: Normal for age and race; warm; dry; good turgor; no apparent lesions or rash.

## 2023-02-14 NOTE — ED PROVIDER NOTE - PATIENT PORTAL LINK FT
You can access the FollowMyHealth Patient Portal offered by [facility] by registering at the following website: [email or web address]. By joining [employer]’s FollowMyHealth portal, you will also be able to view your health information using other applications (apps) compatible with our system.

## 2023-02-14 NOTE — ED PROVIDER NOTE - CARE PROVIDER_API CALL
Han Gonzales)  Orthopaedic Surgery  [address] [facility], [address]  [place] [address]  Phone: [phone]  Fax: [phone]  Follow Up Time:     Jalen Solorio)  Orthopaedic Surgery; Sports Medicine  [address], [address] [place] [address]  Phone: [phone]  Fax: [phone])-  Follow Up Time:

## 2023-02-14 NOTE — ED ADULT NURSE NOTE - OBJECTIVE STATEMENT
64 y/o M presents to the ED c/o left hip pain x4 days. "I practice [employer] and injured my left hip in December. I think I have piriformis syndrome. The pain went away on it's own. But I figured I'd get this checked out because I can't sleep and 500mg ibuprofen and gabapentin do not improve pain. The pain gets better when I massage it though." A&Ox4, NAD, ambulatory independently with a smooth and steady gait. Full BLE ROM.

## 2023-02-14 NOTE — ED PROVIDER NOTE - IV ALTEPLASE ADMIN OUTSIDE HIDDEN
[FreeTextEntry1] : 41 yo with Systemic sclerosis, myositis, Raynaud's, and Interstitial Lung disease in early 2016 with winged right scapula undergoing further evaluation presents for follow up without any complaints today.\par \par Systemic sclerosis: stable at present, continue as per Rheumatology.  Reviewed lab work done by Rheum (included in note) with patient.\par \par Winged scapula: referral given for Neurology, agree with plan for further evaluation as per Ortho, ? need for EMG, etc.  Patient to keep upcoming appointment.\par \par Interstitial lung disease: stable, no active issues, continue as per pulmonary\par \par Palpitations: resolved, patient will follow up in a year's time with Cardiology.\par \par Elevated CPK: stable at present, improved numbers at last lab draw (levels above in results data), most likely responding to treatment for sclerosis, continue as per Rheumatology.\par \par Routine health care: due for pap smear, referred to Gynecology for assistance.  Patient to follow up in about 6 months to a year, call at her convenience or sooner if necessary.   There continues to be walk in hours from 12:30 to 1:30  if the need was to arise for her to come in for an acute ill viist during which she will be seen by one of my colleagues.
show

## 2023-02-14 NOTE — ED PROVIDER NOTE - NSFOLLOWUPINSTRUCTIONS_ED_ALL_ED_FT
Take ibuprofen 800 mg every 8 hours with food.  Follow-up with orthopedic doctor.  Return to ED for worsening pain fever chills nausea vomiting numbness tingling weakness or any other concerning medical problems.      Musculoskeletal Pain    WHAT YOU NEED TO KNOW:    Muscle pain can be dull, achy, or sharp. You may have pain and tenderness to the touch as well. It can occur anywhere on your body and is often brought on by exercise. Muscle pain may occur from an injury, such as a sprain, tendonitis, or bone fracture. Muscle pain can also be the result of medical conditions, such as polymyositis, fibromyalgia, and connective tissue disorders.     DISCHARGE INSTRUCTIONS:    Self care:     Rest as directed and avoid activity that causes pain. You may be able to return to normal activity when you can move without pain. Follow directions for rest and activity. You are at risk for injury for 3 weeks after your symptoms go away.       Ice your painful muscle area to decrease pain and swelling. Use an ice pack, or put ice in a plastic bag and cover it with a towel. Always put a cloth between the ice and your skin. Apply the ice as often as directed for the first 24 to 48 hours.       Compression with a splint, brace, or elastic bandage helps decrease pain and swelling. This may be needed for muscle pain in arms or legs. A splint, brace, or bandage will also help protect the painful area when you move around.       Elevate a painful arm or leg to reduce swelling and pain. Elevate your limb while you are sitting or lying down. Prop a painful leg on pillows to keep it above the level of your heart.    Medicines:     NSAIDs help decrease swelling and pain or fever. This medicine is available with or without a doctor's order. NSAIDs can cause stomach bleeding or kidney problems in certain people. If you take blood thinner medicine, always ask your healthcare provider if NSAIDs are safe for you. Always read the medicine label and follow directions.      Acetaminophen is used to decrease pain. It is available without a doctor's order. Ask your healthcare provider how much to take and when to take it. Follow directions. Acetaminophen can cause liver damage if not taken correctly. Do not take more than one medicine that contains acetaminophen unless directed.       Muscle relaxers help relax your muscles to decrease pain and muscle spasms.       Steroids may be given to decrease redness, pain, and swelling.      Take your medicine as directed. Contact your healthcare provider if you think your medicine is not helping or if you have side effects. Tell him if you are allergic to any medicine. Keep a list of the medicines, vitamins, and herbs you take. Include the amounts, and when and why you take them. Bring the list or the pill bottles to follow-up visits. Carry your medicine list with you in case of an emergency.    Follow up with your healthcare provider as directed: You may need more tests to help healthcare providers find the cause of your muscle pain. You may need physical therapy to learn muscle strengthening exercises. Write down your questions so you remember to ask them during your visits.     Contact your healthcare provider if:     You have a fever.       You have trouble sleeping because of your pain.       Your painful area becomes more tender, red, and warm to the touch.       You have decreased movement of the painful area.       You have questions or concerns about your condition or care.    Return to the emergency department if:     You have increased severe pain when you move the painful muscle area.       You lose feeling in your painful muscle area.       You have new or worse swelling in the painful area. Your skin may feel tight.       You have increased muscle pain or pain that does not improve with treatment.

## 2023-02-14 NOTE — ED PROVIDER NOTE - CLINICAL SUMMARY MEDICAL DECISION MAKING FREE TEXT BOX
62 yo M with no pmh c/o L hip pain x 4 days. Pt states he practices [employer] (last session was 5 days ago) so it is possible he injured himself. Pain gradually started the following day. Pain described as aching. Pt took ibuprofen which did not help. Pt then took gabapentin and naproxen with no relief. Pain better with sitting and worse when walking. Pt had similar pain before in Dec and it improved with time. Denies fever, chills, numbness, tingling, weakness. LLE- +tenderness to inferior hip and lateral thigh, +pain with external rotation, FROM. Strength 5/5. 62 yo M with no pmh c/o L hip pain x 4 days. Pt states he practices [email or web address] (last session was 5 days ago) so it is possible he injured himself. Pain gradually started the following day. Pain described as aching. Pt took ibuprofen which did not help. Pt then took gabapentin and naproxen with no relief. Pain better with sitting and worse when walking. Pt had similar pain before in Dec and it improved with time. Denies fever, chills, numbness, tingling, weakness. LLE- +tenderness to inferior hip and lateral thigh, +pain with external rotation, FROM. Strength 5/5. Xr neg. Advised NSAIDs, rest, will refer to ortho

## 2023-02-15 DIAGNOSIS — Z88.0 ALLERGY STATUS TO PENICILLIN: ICD-10-CM

## 2023-02-15 DIAGNOSIS — M25.552 PAIN IN LEFT HIP: ICD-10-CM

## 2023-03-08 ENCOUNTER — APPOINTMENT (OUTPATIENT)
Dept: ORTHOPEDIC SURGERY | Facility: CLINIC | Age: 63
End: 2023-03-08
Payer: MEDICAID

## 2023-03-08 VITALS — WEIGHT: 213 LBS | HEIGHT: 72 IN | BODY MASS INDEX: 28.85 KG/M2 | RESPIRATION RATE: 16 BRPM

## 2023-03-08 DIAGNOSIS — M76.32 ILIOTIBIAL BAND SYNDROME, LEFT LEG: ICD-10-CM

## 2023-03-08 DIAGNOSIS — M16.0 BILATERAL PRIMARY OSTEOARTHRITIS OF HIP: ICD-10-CM

## 2023-03-08 PROCEDURE — 99214 OFFICE O/P EST MOD 30 MIN: CPT

## 2023-03-08 PROCEDURE — 72190 X-RAY EXAM OF PELVIS: CPT

## 2023-03-15 ENCOUNTER — APPOINTMENT (OUTPATIENT)
Dept: MRI IMAGING | Facility: CLINIC | Age: 63
End: 2023-03-15

## 2023-03-24 ENCOUNTER — APPOINTMENT (OUTPATIENT)
Dept: HEART AND VASCULAR | Facility: CLINIC | Age: 63
End: 2023-03-24

## 2023-04-25 NOTE — ED PROVIDER NOTE - NSTIMEPROVIDERCAREINITIATE_GEN_ER
----- Message from Mally Maldonado sent at 4/25/2023  2:34 PM CDT -----      Name of Who is Calling: ROMAN CAMERON [[id number]]      What is the request in detail: Pt called said she received a injecting when she was seen for her hip and wanted to know if she needed to reschedule her appt.Please contact to further discuss and advise.          Can the clinic reply by [facility]: Y      What Number to Call Back if not in [facility]: [phone]                                        
10-Tim-2023 17:54

## 2023-05-10 ENCOUNTER — EMERGENCY (EMERGENCY)
Facility: HOSPITAL | Age: 63
LOS: 1 days | Discharge: HOME | End: 2023-05-10
Admitting: STUDENT IN AN ORGANIZED HEALTH CARE EDUCATION/TRAINING PROGRAM
Payer: COMMERCIAL

## 2023-05-10 VITALS
SYSTOLIC BLOOD PRESSURE: 131 MMHG | OXYGEN SATURATION: 99 % | RESPIRATION RATE: 18 BRPM | HEART RATE: 73 BPM | DIASTOLIC BLOOD PRESSURE: 68 MMHG | TEMPERATURE: 98 F | WEIGHT: 207.9 LBS

## 2023-05-10 VITALS
HEART RATE: 71 BPM | RESPIRATION RATE: 18 BRPM | DIASTOLIC BLOOD PRESSURE: 71 MMHG | SYSTOLIC BLOOD PRESSURE: 127 MMHG | OXYGEN SATURATION: 99 % | TEMPERATURE: 99 F
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
## 2023-05-10 DIAGNOSIS — R60.0 LOCALIZED EDEMA: ICD-10-CM

## 2023-05-10 DIAGNOSIS — S80.11XA CONTUSION OF RIGHT LOWER LEG, INITIAL ENCOUNTER: ICD-10-CM

## 2023-05-10 DIAGNOSIS — Z88.0 ALLERGY STATUS TO PENICILLIN: ICD-10-CM

## 2023-05-10 DIAGNOSIS — R22.41 LOCALIZED SWELLING, MASS AND LUMP, RIGHT LOWER LIMB: ICD-10-CM

## 2023-05-10 PROCEDURE — 99284 EMERGENCY DEPT VISIT MOD MDM: CPT

## 2023-05-10 PROCEDURE — 93971 EXTREMITY STUDY: CPT | Mod: 26,RT

## 2023-05-10 PROCEDURE — 93971 EXTREMITY STUDY: CPT

## 2023-05-10 PROCEDURE — 99284 EMERGENCY DEPT VISIT MOD MDM: CPT | Mod: 25

## 2023-05-10 RX ORDER — IBUPROFEN 200 MG
600 TABLET ORAL ONCE
Refills: 0 | Status: DISCONTINUED | OUTPATIENT
Start: 2023-05-10 | End: 2023-05-13

## 2023-05-10 NOTE — ED ADULT NURSE NOTE - NS_SISCREENINGSR_GEN_ALL_ED
pt reports right sided tooth pain and swelling for the past couple of days. pt had a dental cleaning last week, was placed on antibiotics for an infection found. pt finished antibiotics. pt has been taking tylenol without relief at home.
Negative

## 2023-05-10 NOTE — ED ADULT NURSE NOTE - CHIEF COMPLAINT QUOTE
[facility]. pt ambulating steadily.

## 2023-05-10 NOTE — ED PROVIDER NOTE - MUSCULOSKELETAL, MLM
Spine appears normal, range of motion is not limited, no muscle or joint tenderness; R LE: + 4x5 cm hematoma to distal shin, + dependent edema to ankle, no warmth, no fluctuance, no induration, no pus, no bleeding, + light touch, soft compartments, muscle strength 5/5, good resistance, pedal pulse 2+, no calf tend, neg fredis's sign

## 2023-05-10 NOTE — ED ADULT TRIAGE NOTE - CHIEF COMPLAINT QUOTE
[facility]. pt ambulating steadily.

## 2023-05-10 NOTE — ED PROVIDER NOTE - NSFOLLOWUPINSTRUCTIONS_ED_ALL_ED_FT
REST, ICE, ELEVATE, ACE WRAP AND USE CRUTCHES, IBUPROFEN AS NEEDED  Hematoma  A hematoma is a collection of blood. A hematoma can happen:  Under the skin.  In an organ.  In a body space.  In a joint space.  In other tissues.  The blood can thicken (clot) to form a lump that you can see and feel. The lump is often hard and may become sore and tender. The lump can be very small or very big. Most hematomas get better in a few days to weeks. However, some hematomas may be serious and need medical care.  What are the causes?  This condition is caused by:  An injury.  Blood that leaks under the skin.  Problems from surgeries.  Medical conditions that cause bleeding or bruising.  What increases the risk?  You are more likely to develop this condition if:  You are an older adult.  You use medicines that thin your blood.  What are the signs or symptoms?  Comparison of a normal ankle and an ankle that is swollen and bruised.  Symptoms depend on where the hematoma is in your body.  If the hematoma is under the skin, there is:  A firm lump on the body.  Pain and tenderness in the area.  Bruising. The skin above the lump may be blue, dark blue, purple-red, or yellowish.  If the hematoma is deep in the tissues or body spaces, there may be:  Blood in the stomach. This may cause pain in the belly (abdomen), weakness, passing out (fainting), and shortness of breath.  Blood in the head. This may cause a headache, weakness, trouble speaking or understanding speech, or passing out.  How is this diagnosed?  This condition is diagnosed based on:  Your medical history.  A physical exam.  Imaging tests, such as ultrasound or CT scan.  Blood tests.  How is this treated?  Treatment depends on the cause, size, and location of the hematoma. Treatment may include:  Doing nothing. Many hematomas go away on their own without treatment.  Surgery or close monitoring. This may be needed for large hematomas or hematomas that affect the body's organs.  Medicines. These may be given if a medical condition caused the hematoma.  Follow these instructions at home:  Managing pain, stiffness, and swelling  Bag of ice on a towel on the skin.  If told, put ice on the area.  Put ice in a plastic bag.  Place a towel between your skin and the bag.  Leave the ice on for 20 minutes, 2–3 times a day for the first two days.  If told, put heat on the affected area after putting ice on the area for two days. Use the heat source that your doctor tells you to use. This could be a moist heat pack or a heating pad. To do this:  Place a towel between your skin and the heat source.  Leave the heat on for 20–30 minutes.  Remove the heat if your skin turns bright red. This is very important if you are unable to feel pain, heat, or cold. You may have a greater risk of getting burned.  Raise (elevate) the affected area above the level of your heart while you are sitting or lying down.  Wrap the affected area with an elastic bandage, if told by your doctor. Do not wrap the bandage too tightly.  If your hematoma is on a leg or foot and is painful, your doctor may give you crutches. Use them as told by your doctor.  General instructions  Take over-the-counter and prescription medicines only as told by your doctor.  Keep all follow-up visits as told by your doctor. This is important.  Contact a doctor if:  You have a fever.  The swelling or bruising gets worse.  You start to get more hematomas.  Get help right away if:  Your pain gets worse.  Your pain is not getting better with medicine.  Your skin over the hematoma breaks or starts to bleed.  Your hematoma is in your chest or belly and you:  Pass out.  Feel weak.  Become short of breath.  You have a hematoma on your scalp that is caused by a fall or injury, and you:  Have a headache that gets worse.  Have trouble speaking or understanding speech.  Become less alert or you pass out.  Summary  A hematoma is a collection of blood in any part of your body.  Most hematomas get better on their own in a few days to weeks. Some may need medical care.  Follow instructions from your doctor about how to care for your hematoma.  Contact a doctor if the swelling or bruising gets worse, or if you are short of breath.

## 2023-05-10 NOTE — ED PROVIDER NOTE - ENMT, MLM
Purple DH (Discharge Huddle; Vulnerable Patient)
Airway patent, Nasal mucosa clear. Mouth with normal mucosa. Throat has no vesicles, no oropharyngeal exudates and uvula is midline.

## 2023-05-10 NOTE — ED PROVIDER NOTE - PATIENT PORTAL LINK FT
You can access the FollowMyHealth Patient Portal offered by [facility] by registering at the following website: [email or web address]. By joining [employer]’s FollowMyHealth portal, you will also be able to view your health information using other applications (apps) compatible with our system.
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
## 2023-05-10 NOTE — ED ADULT NURSE NOTE - NSFALLUNIVINTERV_ED_ALL_ED
Bed/Stretcher in lowest position, wheels locked, appropriate side rails in place/Call bell, personal items and telephone in reach/Instruct patient to call for assistance before getting out of bed/chair/stretcher/Non-slip footwear applied when patient is off stretcher/[place] to call system/Physically safe environment - no spills, clutter or unnecessary equipment/Purposeful proactive rounding/Room/bathroom lighting operational, light cord in reach
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
## 2023-05-10 NOTE — ED ADULT NURSE REASSESSMENT NOTE - NS ED NURSE REASSESS COMMENT FT1
Pt provided with crutches. crutch education done. return demonstration completed. ambulatory w steady gait with crutches.

## 2023-05-10 NOTE — ED ADULT NURSE NOTE - NS ED NOTE ABUSE RESPONSE YN
----- Message from NIKKI Lai sent at 10/2/2019  1:29 PM CDT -----  Normal US      Still large fluid collection from recent trauma    
Spoke with patient. Conveyed results as below, per Camille New PA-C  Patient verbalized understanding.    
Yes

## 2023-05-15 ENCOUNTER — EMERGENCY (EMERGENCY)
Facility: HOSPITAL | Age: 63
LOS: 1 days | Discharge: HOME | End: 2023-05-15
Attending: STUDENT IN AN ORGANIZED HEALTH CARE EDUCATION/TRAINING PROGRAM | Admitting: STUDENT IN AN ORGANIZED HEALTH CARE EDUCATION/TRAINING PROGRAM
Payer: COMMERCIAL

## 2023-05-15 VITALS
DIASTOLIC BLOOD PRESSURE: 59 MMHG | TEMPERATURE: 98 F | HEART RATE: 76 BPM | RESPIRATION RATE: 18 BRPM | OXYGEN SATURATION: 98 % | WEIGHT: 205.03 LBS | SYSTOLIC BLOOD PRESSURE: 132 MMHG

## 2023-05-15 PROCEDURE — 99284 EMERGENCY DEPT VISIT MOD MDM: CPT

## 2023-05-15 PROCEDURE — 99282 EMERGENCY DEPT VISIT SF MDM: CPT

## 2023-05-15 RX ORDER — ACETAMINOPHEN 500 MG
2 TABLET ORAL
Qty: 30 | Refills: 0
Start: 2023-05-15

## 2023-05-15 RX ORDER — IBUPROFEN 200 MG
1 TABLET ORAL
Qty: 20 | Refills: 0
Start: 2023-05-15
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
## 2023-05-15 NOTE — ED ADULT TRIAGE NOTE - GLASGOW COMA SCALE: BEST MOTOR RESPONSE, MLM
"      [facility] Note    Subjective     CC: Follow-up (3 week MRI (7/8/22) recheck - Acute pain of right knee)      GABBY Camara is a 55 y.o. female.  She is not any better.  She is still on crutches and use of brace.  She has mechanical symptoms.  This started with a fall 2 months ago.    Review of Systems   Constitutional: Negative.  Negative for chills, fatigue and fever.   HENT: Positive for hearing loss and tinnitus. Negative for congestion and dental problem.    Eyes: Negative.  Negative for blurred vision.   Respiratory: Negative.  Negative for shortness of breath.    Cardiovascular: Negative.  Negative for leg swelling.   Gastrointestinal: Negative.  Negative for abdominal pain.   Endocrine: Negative.  Negative for polyuria.   Genitourinary: Negative.  Negative for difficulty urinating.   Musculoskeletal: Positive for arthralgias, back pain and joint swelling.   Skin: Negative.    Allergic/Immunologic: Negative.    Neurological: Negative.    Hematological: Negative.  Negative for adenopathy.   Psychiatric/Behavioral: Negative.  Negative for behavioral problems.       ROS:    Constiutional:Pt denies fever, chills, nausea, or vomiting.  MSK:as above      Objective      Past Medical History  Past Medical History:   Diagnosis Date   • Allergic    • Anxiety    • Arthritis    • Arthritis of back    • Encephalitis    • Headache    • Hip arthrosis    • Knee swelling    • Tear of meniscus of knee    • Torn meniscus 07/2022         Physical Exam  /78   Ht 154.9 cm (60.98\")   Wt (!) 154 kg (339 lb 8.1 oz)   BMI 64.18 kg/m²     Body mass index is 64.18 kg/m².    Patient is well nourished and well developed.        Ortho Exam  She is obese.  Medial joint tenderness.  Pain with Kiersten.  Stable ligaments.    Imaging/Labs/EMG Reviewed:  Imaging Results (Last 24 Hours)     ** No results found for the last 24 hours. **        I viewed her MRI from July 8 which shows a complex medial " meniscus tear with loose body and arthritis    Assessment:  1. Acute pain of right knee    2. Complex tear of medial meniscus of right knee as current injury, subsequent encounter    3. Class 3 severe obesity due to excess calories without serious comorbidity with body mass index (BMI) of 60.0 to 69.9 in adult (HCC)        Plan:  1. I recommend right knee arthroscopy partial meniscectomy.  She will need to work on weight loss.  BMI may obligate her to be done at the hospital.Treatment options and alternatives were discussed with patient.  Surgical risks include but are not limited to pain, bleeding, infection, failure to relieve symptoms, need for further procedures, recurrence of symptoms, damage to healthy adjacent structures, hardware loosening/failure, stiffness, weakness, scar, blood clots/DVT/PE, loss of limb or life. We also discussed the postoperative protocol and expected outcome although no guarantees are possible with surgery. All questions were answered; the patient would like to proceed with surgical intervention.    Follow Up:   Return for 1 week after surgery.      Medical Decision Making  Management Options : Moderate - Decision regarding minor surgery with identified patient  or procedure risk factors        Zia Ferraro M.D., [facility]  Orthopedic Surgeon  Fellowship Trained Sports Medicine  [facility]  Orthopedics and Sports Medicine  [address] [place] [address], [address]  [place] [address]    EMR Dragon/Transcription disclaimer:  Much of this encounter note is an electronic transcription of spoken language to printed text. Electronic transcription of spoken language may permit erroneous, or at times, nonsensical words or phrases to be inadvertently transcribed. Although I have reviewed the note for such errors, some may still exist.   (M6) obeys commands
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
## 2023-05-15 NOTE — ED ADULT NURSE NOTE - CHIEF COMPLAINT QUOTE
Pt c/o RLE bruising/swelling s/p playing "[employer]." Seen in ED Thursday for same c/o.

## 2023-05-15 NOTE — ED PROVIDER NOTE - PHYSICAL EXAMINATION
CONST: nontoxic NAD speaking in full sentences  HEAD: atraumatic  EYES: conjunctivae clear  ENT: mmm  NECK: supple  CARD: rrr   CHEST: no stridor/retractions/tripoding  EXT: RLE with hematoma to anteromedial shin, soft, no induration, no crepitus. Compartments soft. +palpable pulses, wiggling toes, sensation intact  SKIN: warm, dry  NEURO: awake alert answering questions following commands moving all extremities

## 2023-05-15 NOTE — ED PROVIDER NOTE - PATIENT PORTAL LINK FT
You can access the FollowMyHealth Patient Portal offered by [facility] by registering at the following website: [email or web address]. By joining [employer]’s FollowMyHealth portal, you will also be able to view your health information using other applications (apps) compatible with our system.

## 2023-05-15 NOTE — ED PROVIDER NOTE - CLINICAL SUMMARY MEDICAL DECISION MAKING FREE TEXT BOX
No indication for hematoma drainage in the ED, it is 2 weeks old with likely clotted blood and has been improving w/ RICE therapy  counseled pt to continue RICE and it will take time. Will give referral to surgery clinic per pt request  No further indication for emergent or inpatient workup, pt stable and ready for discharge. Results, diagnosis and post-discharge plan including appropriate outpatient follow up were discussed and all questions answered

## 2023-05-15 NOTE — ED PROVIDER NOTE - NSFOLLOWUPCLINICS_GEN_ALL_ED_FT
Consultation Center at [facility] Surgery  [address] [place] [address]  Phone: [phone]  Fax:

## 2023-05-16 DIAGNOSIS — S80.11XA CONTUSION OF RIGHT LOWER LEG, INITIAL ENCOUNTER: ICD-10-CM
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
## 2023-05-16 DIAGNOSIS — Z88.0 ALLERGY STATUS TO PENICILLIN: ICD-10-CM
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
## 2023-05-17 ENCOUNTER — INPATIENT (INPATIENT)
Facility: HOSPITAL | Age: 63
LOS: 0 days | Discharge: HOME | DRG: 603 | End: 2023-05-18
Attending: SURGERY | Admitting: SURGERY
Payer: COMMERCIAL

## 2023-05-17 VITALS
TEMPERATURE: 98 F | HEART RATE: 67 BPM | RESPIRATION RATE: 18 BRPM | SYSTOLIC BLOOD PRESSURE: 135 MMHG | WEIGHT: 205.03 LBS | OXYGEN SATURATION: 98 % | DIASTOLIC BLOOD PRESSURE: 69 MMHG

## 2023-05-17 DIAGNOSIS — S86.812A STRAIN OF OTHER MUSCLE(S) AND TENDON(S) AT LOWER LEG LEVEL, LEFT LEG, INITIAL ENCOUNTER: Chronic | ICD-10-CM

## 2023-05-17 LAB
ALBUMIN SERPL ELPH-MCNC: 3.5 G/DL — SIGNIFICANT CHANGE UP (ref 3.3–5)
ALP SERPL-CCNC: 108 U/L — SIGNIFICANT CHANGE UP (ref 40–120)
ALT FLD-CCNC: 48 U/L — HIGH (ref 10–45)
ANION GAP SERPL CALC-SCNC: 11 MMOL/L — SIGNIFICANT CHANGE UP (ref 5–17)
APTT BLD: 30 SEC — SIGNIFICANT CHANGE UP (ref 27.5–35.5)
AST SERPL-CCNC: 44 U/L — HIGH (ref 10–40)
BASOPHILS # BLD AUTO: 0.03 K/UL — SIGNIFICANT CHANGE UP (ref 0–0.2)
BASOPHILS NFR BLD AUTO: 0.4 % — SIGNIFICANT CHANGE UP (ref 0–2)
BILIRUB SERPL-MCNC: 0.3 MG/DL — SIGNIFICANT CHANGE UP (ref 0.2–1.2)
BUN SERPL-MCNC: 11 MG/DL — SIGNIFICANT CHANGE UP (ref 7–23)
CALCIUM SERPL-MCNC: 9 MG/DL — SIGNIFICANT CHANGE UP (ref 8.4–10.5)
CHLORIDE SERPL-SCNC: 100 MMOL/L — SIGNIFICANT CHANGE UP (ref 96–108)
CO2 SERPL-SCNC: 27 MMOL/L — SIGNIFICANT CHANGE UP (ref 22–31)
CREAT SERPL-MCNC: 0.89 MG/DL — SIGNIFICANT CHANGE UP (ref 0.5–1.3)
EGFR: 98 ML/MIN/1.73M2 — SIGNIFICANT CHANGE UP
EOSINOPHIL # BLD AUTO: 0.17 K/UL — SIGNIFICANT CHANGE UP (ref 0–0.5)
EOSINOPHIL NFR BLD AUTO: 2.2 % — SIGNIFICANT CHANGE UP (ref 0–6)
GLUCOSE SERPL-MCNC: 95 MG/DL — SIGNIFICANT CHANGE UP (ref 70–99)
GRAM STN FLD: SIGNIFICANT CHANGE UP
HCT VFR BLD CALC: 37.6 % — LOW (ref 39–50)
HGB BLD-MCNC: 12.2 G/DL — LOW (ref 13–17)
IMM GRANULOCYTES NFR BLD AUTO: 0.4 % — SIGNIFICANT CHANGE UP (ref 0–0.9)
INR BLD: 1.17 — HIGH (ref 0.88–1.16)
LYMPHOCYTES # BLD AUTO: 1.4 K/UL — SIGNIFICANT CHANGE UP (ref 1–3.3)
LYMPHOCYTES # BLD AUTO: 18 % — SIGNIFICANT CHANGE UP (ref 13–44)
MCHC RBC-ENTMCNC: 28.1 PG — SIGNIFICANT CHANGE UP (ref 27–34)
MCHC RBC-ENTMCNC: 32.4 GM/DL — SIGNIFICANT CHANGE UP (ref 32–36)
MCV RBC AUTO: 86.6 FL — SIGNIFICANT CHANGE UP (ref 80–100)
MONOCYTES # BLD AUTO: 0.56 K/UL — SIGNIFICANT CHANGE UP (ref 0–0.9)
MONOCYTES NFR BLD AUTO: 7.2 % — SIGNIFICANT CHANGE UP (ref 2–14)
NEUTROPHILS # BLD AUTO: 5.57 K/UL — SIGNIFICANT CHANGE UP (ref 1.8–7.4)
NEUTROPHILS NFR BLD AUTO: 71.8 % — SIGNIFICANT CHANGE UP (ref 43–77)
NRBC # BLD: 0 /100 WBCS — SIGNIFICANT CHANGE UP (ref 0–0)
PLATELET # BLD AUTO: 268 K/UL — SIGNIFICANT CHANGE UP (ref 150–400)
POTASSIUM SERPL-MCNC: SIGNIFICANT CHANGE UP MMOL/L (ref 3.5–5.3)
POTASSIUM SERPL-SCNC: SIGNIFICANT CHANGE UP MMOL/L (ref 3.5–5.3)
PROT SERPL-MCNC: 7.4 G/DL — SIGNIFICANT CHANGE UP (ref 6–8.3)
PROTHROM AB SERPL-ACNC: 13.9 SEC — HIGH (ref 10.5–13.4)
RBC # BLD: 4.34 M/UL — SIGNIFICANT CHANGE UP (ref 4.2–5.8)
RBC # FLD: 13.6 % — SIGNIFICANT CHANGE UP (ref 10.3–14.5)
SODIUM SERPL-SCNC: 138 MMOL/L — SIGNIFICANT CHANGE UP (ref 135–145)
SPECIMEN SOURCE: SIGNIFICANT CHANGE UP
WBC # BLD: 7.76 K/UL — SIGNIFICANT CHANGE UP (ref 3.8–10.5)
WBC # FLD AUTO: 7.76 K/UL — SIGNIFICANT CHANGE UP (ref 3.8–10.5)

## 2023-05-17 PROCEDURE — 99285 EMERGENCY DEPT VISIT HI MDM: CPT

## 2023-05-17 PROCEDURE — 73590 X-RAY EXAM OF LOWER LEG: CPT | Mod: 26,RT

## 2023-05-17 PROCEDURE — 93971 EXTREMITY STUDY: CPT | Mod: 26,RT

## 2023-05-17 RX ORDER — ONDANSETRON 8 MG/1
4 TABLET, FILM COATED ORAL EVERY 6 HOURS
Refills: 0 | Status: DISCONTINUED | OUTPATIENT
Start: 2023-05-17 | End: 2023-05-18

## 2023-05-17 RX ORDER — OXYCODONE HYDROCHLORIDE 5 MG/1
5 TABLET ORAL EVERY 6 HOURS
Refills: 0 | Status: DISCONTINUED | OUTPATIENT
Start: 2023-05-17 | End: 2023-05-18

## 2023-05-17 RX ORDER — SODIUM CHLORIDE 9 MG/ML
1000 INJECTION, SOLUTION INTRAVENOUS
Refills: 0 | Status: DISCONTINUED | OUTPATIENT
Start: 2023-05-17 | End: 2023-05-17

## 2023-05-17 RX ORDER — VANCOMYCIN HCL 1 G
1500 VIAL (EA) INTRAVENOUS EVERY 12 HOURS
Refills: 0 | Status: DISCONTINUED | OUTPATIENT
Start: 2023-05-18 | End: 2023-05-18

## 2023-05-17 RX ORDER — MORPHINE SULFATE 50 MG/1
4 CAPSULE, EXTENDED RELEASE ORAL ONCE
Refills: 0 | Status: DISCONTINUED | OUTPATIENT
Start: 2023-05-17 | End: 2023-05-17

## 2023-05-17 RX ORDER — SODIUM CHLORIDE 9 MG/ML
1000 INJECTION INTRAMUSCULAR; INTRAVENOUS; SUBCUTANEOUS ONCE
Refills: 0 | Status: COMPLETED | OUTPATIENT
Start: 2023-05-17 | End: 2023-05-17

## 2023-05-17 RX ORDER — VANCOMYCIN HCL 1 G
1000 VIAL (EA) INTRAVENOUS ONCE
Refills: 0 | Status: DISCONTINUED | OUTPATIENT
Start: 2023-05-17 | End: 2023-05-17

## 2023-05-17 RX ORDER — VANCOMYCIN HCL 1 G
1500 VIAL (EA) INTRAVENOUS ONCE
Refills: 0 | Status: COMPLETED | OUTPATIENT
Start: 2023-05-17 | End: 2023-05-17

## 2023-05-17 RX ORDER — ACETAMINOPHEN 500 MG
650 TABLET ORAL EVERY 6 HOURS
Refills: 0 | Status: DISCONTINUED | OUTPATIENT
Start: 2023-05-17 | End: 2023-05-18

## 2023-05-17 RX ORDER — HEPARIN SODIUM 5000 [USP'U]/ML
5000 INJECTION INTRAVENOUS; SUBCUTANEOUS EVERY 8 HOURS
Refills: 0 | Status: DISCONTINUED | OUTPATIENT
Start: 2023-05-17 | End: 2023-05-18

## 2023-05-17 RX ORDER — HYDROMORPHONE HYDROCHLORIDE 2 MG/ML
1 INJECTION INTRAMUSCULAR; INTRAVENOUS; SUBCUTANEOUS ONCE
Refills: 0 | Status: DISCONTINUED | OUTPATIENT
Start: 2023-05-17 | End: 2023-05-17

## 2023-05-17 RX ADMIN — Medication 300 MILLIGRAM(S): at 17:24

## 2023-05-17 RX ADMIN — SODIUM CHLORIDE 40 MILLILITER(S): 9 INJECTION, SOLUTION INTRAVENOUS at 21:56

## 2023-05-17 RX ADMIN — Medication 1500 MILLIGRAM(S): at 20:02

## 2023-05-17 RX ADMIN — HYDROMORPHONE HYDROCHLORIDE 1 MILLIGRAM(S): 2 INJECTION INTRAMUSCULAR; INTRAVENOUS; SUBCUTANEOUS at 21:06

## 2023-05-17 RX ADMIN — MORPHINE SULFATE 4 MILLIGRAM(S): 50 CAPSULE, EXTENDED RELEASE ORAL at 20:31

## 2023-05-17 RX ADMIN — HEPARIN SODIUM 5000 UNIT(S): 5000 INJECTION INTRAVENOUS; SUBCUTANEOUS at 21:56

## 2023-05-17 RX ADMIN — MORPHINE SULFATE 4 MILLIGRAM(S): 50 CAPSULE, EXTENDED RELEASE ORAL at 16:36

## 2023-05-17 RX ADMIN — SODIUM CHLORIDE 1000 MILLILITER(S): 9 INJECTION INTRAMUSCULAR; INTRAVENOUS; SUBCUTANEOUS at 16:36

## 2023-05-17 RX ADMIN — MORPHINE SULFATE 4 MILLIGRAM(S): 50 CAPSULE, EXTENDED RELEASE ORAL at 17:07

## 2023-05-17 RX ADMIN — MORPHINE SULFATE 4 MILLIGRAM(S): 50 CAPSULE, EXTENDED RELEASE ORAL at 21:44

## 2023-05-17 RX ADMIN — HYDROMORPHONE HYDROCHLORIDE 1 MILLIGRAM(S): 2 INJECTION INTRAMUSCULAR; INTRAVENOUS; SUBCUTANEOUS at 21:44

## 2023-05-17 NOTE — PATIENT PROFILE ADULT - FUNCTIONAL ASSESSMENT - BASIC MOBILITY 1.
Medical Week 3 Survey      Responses   Facility patient discharged from?  [facility]   Does the patient have one of the following disease processes/diagnoses(primary or secondary)?  Other   Week 3 attempt successful?  No   Unsuccessful attempts  Attempt 2          Belinda Horne RN   3 = A little assistance

## 2023-05-17 NOTE — PROCEDURE NOTE - NSLATERALITY_GEN_A_CORE
Per teleneurologist BP needs 180/105 prior to tpa infusion, ER MD made aware, verbal order for labetalol received Right

## 2023-05-17 NOTE — ED PROVIDER NOTE - CPE EDP SKIN NORM
- - - Patient called stating his device home monitor has been blinking during the night.  Patient is inquiring if information has been transmitted.  Please call [phone].   Problem: Occupational Therapy Goal  Goal: Occupational Therapy Goal  Goals to be met by: 8/20/2019     Patient will increase functional independence with ADLs by performing:    UE Dressing with [place].  LE Dressing with Modified [place] with AE PRN.  Grooming while seated with Set-up Assistance.  Toileting from [facility] with Supervision for hygiene and clothing management. -updated 8/6  Toilet transfer to BS with Supervision with LRAD PRN. -updated 8/6     Outcome: Ongoing (interventions implemented as appropriate)  OT re-evaluation completed and POC reviewed and updated as needed.  Ernestina Holley OT  8/6/2019

## 2023-05-17 NOTE — H&P ADULT - ASSESSMENT
62yo Male pt with no PMH and PSx of left patellar tendon repair presents to the ED on 5/17 with 2 week history of increasing right anteriomedial calf swelling and tenderness after trauma to the affected region. Pt afebrile, HDS. Labs significant for WBC 7.76, Hb 12.2, and an unremarkable chem panel. PE notable for a 20x6cm area of tender fluctuance along the anteriomedial aspect of the right calf with circumferential erythema. RLE U/S demonstrates no DVT and a 9.9 x 2.8 x 4.8 cm avascular complex collection with internal echoes and trace subcutaneous edema. Subjective history, physical exam, and radiographic findings concerning for infected hematoma of the right calf with localized cellulitis. Now s/p I&D with evacuation of approximately 75cc purulent clot and placement of penrose drain.    Plan:  Admit to [facility], Team 4, Dr. Granados  Regular diet  Pain/nausea control PRN  Vancomycin  F/U wound culture  HSQ/SCDs/OOB/IS  AM labs

## 2023-05-17 NOTE — ED PROVIDER NOTE - PHYSICAL EXAMINATION
RLE: anteromedial shin region: large 12x6cm area of swelling/fluctuance/ttp. There is surrounding pitting edema as well as as surrounding and overlying erythema/warmth. ttp to entire region. Sloughing of central superficial skin.   normal equal distal pulses, sensation intact, normal dorsi/plantar flexion.  no crepitus

## 2023-05-17 NOTE — ED ADULT NURSE NOTE - OBJECTIVE STATEMENT
63M presents to ED c/o persistent pain to right calf with increase in swelling and now discharge from wound. pt with injury to RLE about 2 weeks [place] [employer]. pt seen in ED on 5/10 with negative DVT result on US and discharged home with surgery consult. pt referred by PCP today for evaluation in ED. RLE noted to be red, swollen, shiny and tender to touch. denies fevers/chills, n/v/d, headache/dizziness or weakness. pt last took tylenol prior to arrival. pt speaking in clear, complete sentences. RR easy, even, un-labored on room air

## 2023-05-17 NOTE — ED PROVIDER NOTE - CLINICAL SUMMARY MEDICAL DECISION MAKING FREE TEXT BOX
63M p/w RLE swelling. ~2w ago pt sustained injury to RLE during jujitsu. Was in ED 5/10, exam noted "R LE: + 4x5 cm hematoma to distal shin, + dependent edema to ankle," had RLE duplex w/ no DVT. Returned to ED 2d ago requesting that the hematoma gets drained. Exam noted "RLE with hematoma to anteromedial shin." Note indicates that as the swelling has been improving w/ RICE, there was no indication for emergent drainage in ED and pt was dc'd w/ referral to surgery. Pt states that yesterday a small opening in the skin developed and was draining red/yellow fluid. Additionally he went to his PMD today who referred to ED. Pt states that overall the pain/swelling is worsening. No other systemic symptoms.   Vitals wnl, exam as above.  ddx: May have started as hematoma but now high suspicion for cellulitis, possible abscess.   Labs, symptom control, surgery consulted.

## 2023-05-17 NOTE — PATIENT PROFILE ADULT - FALL HARM RISK - RISK INTERVENTIONS
Assistance OOB with selected safe patient handling equipment/Assistance with ambulation/Communicate Fall Risk and Risk Factors to all staff, patient, and family/Monitor gait and stability/Reinforce activity limits and safety measures with patient and family/Sit up slowly, dangle for a short time, stand at bedside before walking/Use of alarms - bed, chair and/or voice tab/Visual Cue: Yellow wristband/Bed in lowest position, wheels locked, appropriate side rails in place/Call bell, personal items and telephone in reach/Instruct patient to call for assistance before getting out of bed or chair/Non-slip footwear when patient is out of bed/[place] to call system/Physically safe environment - no spills, clutter or unnecessary equipment/Purposeful Proactive Rounding/Room/bathroom lighting operational, light cord in reach

## 2023-05-17 NOTE — ED PROVIDER NOTE - PROGRESS NOTE DETAILS
[place]: Hgb 12.2, INR 1.17, K hemolyzed, AST 44, ALT 48, other labs grossly wnl. US (requested by surgery) showed "1.  No evidence of right lower extremity deep venous thrombosis. 2.  Right lower leg (below the knee) complex fluid collection measuring 9.9 x 2.8 x 4.8 cm"   XR w/ no gas.   Pt remains stable. Rediscussed w/ surgery, awaiting recs. [place]: Surgery performed I and D w/ copious purulent/bloody drainage. Will admit surgery for further care. Updated pt.

## 2023-05-17 NOTE — H&P ADULT - NSHPLABSRESULTS_GEN_ALL_CORE
LABS:                        12.2   7.76  )-----------( 268      ( 17 May 2023 16:24 )             37.6     05-17    138  |  100  |  11  ----------------------------<  95  SEE NOTE   |  27  |  0.89    Ca    9.0      17 May 2023 16:24    TPro  7.4  /  Alb  3.5  /  TBili  0.3  /  DBili  x   /  AST  44<H>  /  ALT  48<H>  /  AlkPhos  108  05-17    PT/INR - ( 17 May 2023 16:24 )   PT: 13.9 sec;   INR: 1.17          PTT - ( 17 May 2023 16:24 )  PTT:30.0 sec    ACC: [id number] EXAM: US DPLX LWR EXT VEINS LTD RT ORDERED BY: TYRA MONTOYA    PROCEDURE DATE: 05/17/2023        INTERPRETATION: CLINICAL INFORMATION: eval DVT as well as extent of fluid colleciton/abscess. RLE swelling/fluctuance.    COMPARISON: Lower extremity Doppler ultrasound 5/10/2023.    TECHNIQUE: Duplex sonography of the RIGHT LOWER extremity veins with color and spectral Doppler, with and without compression.    FINDINGS:    There is normal compressibility of the right common femoral, femoral and popliteal veins.  The contralateral left common femoral vein is patent.  Doppler examination shows normal spontaneous and phasic flow.    No calf vein thrombosis is detected.    Area of concern below the knee:  9.9 x 2.8 x 4.8 cm avascular complex collection with internal echoes. Trace subcutaneous edema.    IMPRESSION:  1. No evidence of right lower extremity deep venous thrombosis.  2. Right lower leg (below the knee) complex fluid collection measuring 9.9 x 2.8 x 4.8 cm

## 2023-05-17 NOTE — PROCEDURE NOTE - ADDITIONAL PROCEDURE DETAILS
3cm elliptical incision made along inferior aspect of area of fluctuance with immediate evacuation of purulent clot. Wound culture obtained Additional clot expressed manually. Wound interrogated with curved hemostat and loculations disrupted. Wound noted to track along the entire length of the affected area. Counter incision made along superior aspect of the wound and additional loculations disrupted using curved hemostat. Wound copiously irrigated with saline. 1/4" penrose drain left in place exiting both incisions. Wounds packed with 1/2" iodoform. Hemostasis achieved. Dressed with guaze and kerlix.    Approximately 75cc of purulent clot evacuated from wound in total

## 2023-05-17 NOTE — H&P ADULT - NSHPPHYSICALEXAM_GEN_ALL_CORE
Vital Signs Last 24 Hrs  T(C): 37.2 (17 May 2023 22:04), Max: 37.2 (17 May 2023 22:04)  T(F): 98.9 (17 May 2023 22:04), Max: 98.9 (17 May 2023 22:04)  HR: 64 (17 May 2023 22:04) (56 - 67)  BP: 137/68 (17 May 2023 22:04) (135/69 - 160/70)  BP(mean): --  RR: 18 (17 May 2023 22:04) (18 - 18)  SpO2: 96% (17 May 2023 22:04) (96% - 98%)    Parameters below as of 17 May 2023 22:04  Patient On (Oxygen Delivery Method): room air    General: AAOx3, NAD, laying comfortably in bed  Cardio: S1,S2, No MRG  Pulm: Nonlabored breathing  Abdomen: soft, ntnd, no rebound, no guarding  RLE: 20x6cm area of fluctuance along the anteriomedial aspect of the right calf with circumfrential erythema. No noted areas of current drainage. Tenderness to palpation does not extend beyond area of erythema, no pain on dorsiflexon/plantar flexion  Extremities: WWP, peripheral pulses appreciated

## 2023-05-17 NOTE — H&P ADULT - HISTORY OF PRESENT ILLNESS
62yo Male pt with no PMH and PSx of left patellar tendon repair presents to the ED on 5/17 with 2 week history of increasing right medial calf swelling and tenderness. Pt states that approximately 2 weeks ago he was kicked in the shin during a jujitsu class and subsequently developed a large area of swelling. States he has been taking Ibuprofen/Tylenol since the episode with partial alleviation of the pain and has not been having difficulty ambulating. Of note, pt was seen in the ED on 5/10 at which time a POCUS demonstrated soft tissue swelling and hematoma without evidence of DVT and again on 5/15 and was subsequently discharged with the same diagnosis of soft tissue hematoma. States that since his last presentation he has noted skin effacement and some seropurlent drainage from the superior aspect of the wound. States that he was seen by his PCP today who recommended he come back to the ED today for further evaluation. On presentation, pt continues to report moderate pain to the right leg which does not radiate beyond the noted erythema. Denies any constitutional symptoms including fevers, chills, nausea, vomiting, or changes in bowel/urinary habits since the onset of symptoms. Denies difficulty ambulating, numbness or tingling of the toes, and decreased ROM.    Last colonoscopy - 5/13 - 1 polyp removed  Denies family hx of IBS, Crohn's, UC, or colon cancer.    In the ED, pt afebrile, nontachycardic, normotensive, and satting on RA. Labs significant for WBC 7.76, Hb 12.2, and an unremarkable chem panel. PE notable for a 20x6cm area of fluctuance along the anteriomedial aspect of the right calf with circumfrential erythema. No noted areas of current drainage. Tenderness to palpation does not extend beyond area of erythema. RLE U/S demonstrates no DVT and a 9.9 x 2.8 x 4.8 cm avascular complex collection with internal echoes and trace subcutaneous edema.    PMH: None  PSx: Left patellar tendon repair  Social Hx: Nonsmoker, social Etoh, no illicits  Family Hx: Noncontributory    Meds: None

## 2023-05-17 NOTE — ED PROVIDER NOTE - OBJECTIVE STATEMENT
63M p/w RLE swelling. ~2w ago pt sustained injury to RLE during jujitsu. Was in ED 5/10, exam noted "R LE: + 4x5 cm hematoma to distal shin, + dependent edema to ankle," had RLE duplex w/ no DVT. Returned to ED 2d ago requesting that the hematoma gets drained. Exam noted "RLE with hematoma to anteromedial shin." Note indicates that as the swelling has been improving w/ RICE, there was no indication for emergent drainage in ED and pt was dc'd w/ referral to surgery. Pt states that yesterday a small opening in the skin developed and was draining red/yellow fluid. Additionally he went to his PMD today who referred to ED. Pt states that overall the pain/swelling is worsening. No other systemic symptoms.   Denies f/c, SOB/CP, lightheaded, fatigue, URI symptoms, NVD, abd pain, focal weakness/numbness, other injuries.

## 2023-05-18 ENCOUNTER — TRANSCRIPTION ENCOUNTER (OUTPATIENT)
Age: 63
End: 2023-05-18

## 2023-05-18 VITALS
SYSTOLIC BLOOD PRESSURE: 114 MMHG | TEMPERATURE: 98 F | OXYGEN SATURATION: 97 % | DIASTOLIC BLOOD PRESSURE: 60 MMHG | HEART RATE: 62 BPM | RESPIRATION RATE: 17 BRPM

## 2023-05-18 LAB
ANION GAP SERPL CALC-SCNC: 7 MMOL/L — SIGNIFICANT CHANGE UP (ref 5–17)
BUN SERPL-MCNC: 10 MG/DL — SIGNIFICANT CHANGE UP (ref 7–23)
CALCIUM SERPL-MCNC: 9.1 MG/DL — SIGNIFICANT CHANGE UP (ref 8.4–10.5)
CHLORIDE SERPL-SCNC: 100 MMOL/L — SIGNIFICANT CHANGE UP (ref 96–108)
CO2 SERPL-SCNC: 28 MMOL/L — SIGNIFICANT CHANGE UP (ref 22–31)
CREAT SERPL-MCNC: 0.91 MG/DL — SIGNIFICANT CHANGE UP (ref 0.5–1.3)
EGFR: 96 ML/MIN/1.73M2 — SIGNIFICANT CHANGE UP
GLUCOSE SERPL-MCNC: 111 MG/DL — HIGH (ref 70–99)
HCT VFR BLD CALC: 33.8 % — LOW (ref 39–50)
HCV AB S/CO SERPL IA: 0.09 S/CO — SIGNIFICANT CHANGE UP (ref 0–0.99)
HCV AB SERPL-IMP: SIGNIFICANT CHANGE UP
HGB BLD-MCNC: 10.7 G/DL — LOW (ref 13–17)
MAGNESIUM SERPL-MCNC: 2 MG/DL — SIGNIFICANT CHANGE UP (ref 1.6–2.6)
MCHC RBC-ENTMCNC: 27.9 PG — SIGNIFICANT CHANGE UP (ref 27–34)
MCHC RBC-ENTMCNC: 31.7 GM/DL — LOW (ref 32–36)
MCV RBC AUTO: 88 FL — SIGNIFICANT CHANGE UP (ref 80–100)
NRBC # BLD: 0 /100 WBCS — SIGNIFICANT CHANGE UP (ref 0–0)
PHOSPHATE SERPL-MCNC: 3 MG/DL — SIGNIFICANT CHANGE UP (ref 2.5–4.5)
PLATELET # BLD AUTO: 257 K/UL — SIGNIFICANT CHANGE UP (ref 150–400)
POTASSIUM SERPL-MCNC: 4.2 MMOL/L — SIGNIFICANT CHANGE UP (ref 3.5–5.3)
POTASSIUM SERPL-SCNC: 4.2 MMOL/L — SIGNIFICANT CHANGE UP (ref 3.5–5.3)
RBC # BLD: 3.84 M/UL — LOW (ref 4.2–5.8)
RBC # FLD: 13.9 % — SIGNIFICANT CHANGE UP (ref 10.3–14.5)
SODIUM SERPL-SCNC: 135 MMOL/L — SIGNIFICANT CHANGE UP (ref 135–145)
WBC # BLD: 6.69 K/UL — SIGNIFICANT CHANGE UP (ref 3.8–10.5)
WBC # FLD AUTO: 6.69 K/UL — SIGNIFICANT CHANGE UP (ref 3.8–10.5)

## 2023-05-18 PROCEDURE — 85025 COMPLETE CBC W/AUTO DIFF WBC: CPT

## 2023-05-18 PROCEDURE — 80053 COMPREHEN METABOLIC PANEL: CPT

## 2023-05-18 PROCEDURE — 83735 ASSAY OF MAGNESIUM: CPT

## 2023-05-18 PROCEDURE — 93971 EXTREMITY STUDY: CPT

## 2023-05-18 PROCEDURE — 87186 SC STD MICRODIL/AGAR DIL: CPT

## 2023-05-18 PROCEDURE — 84100 ASSAY OF PHOSPHORUS: CPT

## 2023-05-18 PROCEDURE — 99222 1ST HOSP IP/OBS MODERATE 55: CPT

## 2023-05-18 PROCEDURE — 96365 THER/PROPH/DIAG IV INF INIT: CPT

## 2023-05-18 PROCEDURE — 82550 ASSAY OF CK (CPK): CPT

## 2023-05-18 PROCEDURE — 86803 HEPATITIS C AB TEST: CPT

## 2023-05-18 PROCEDURE — 87070 CULTURE OTHR SPECIMN AEROBIC: CPT

## 2023-05-18 PROCEDURE — 80048 BASIC METABOLIC PNL TOTAL CA: CPT

## 2023-05-18 PROCEDURE — G0378: CPT

## 2023-05-18 PROCEDURE — 85730 THROMBOPLASTIN TIME PARTIAL: CPT

## 2023-05-18 PROCEDURE — 96366 THER/PROPH/DIAG IV INF ADDON: CPT

## 2023-05-18 PROCEDURE — 73590 X-RAY EXAM OF LOWER LEG: CPT

## 2023-05-18 PROCEDURE — 85027 COMPLETE CBC AUTOMATED: CPT

## 2023-05-18 PROCEDURE — 82553 CREATINE MB FRACTION: CPT

## 2023-05-18 PROCEDURE — 87205 SMEAR GRAM STAIN: CPT

## 2023-05-18 PROCEDURE — 36415 COLL VENOUS BLD VENIPUNCTURE: CPT

## 2023-05-18 PROCEDURE — 96375 TX/PRO/DX INJ NEW DRUG ADDON: CPT

## 2023-05-18 PROCEDURE — 99285 EMERGENCY DEPT VISIT HI MDM: CPT

## 2023-05-18 PROCEDURE — 85610 PROTHROMBIN TIME: CPT

## 2023-05-18 PROCEDURE — 96376 TX/PRO/DX INJ SAME DRUG ADON: CPT

## 2023-05-18 RX ORDER — IBUPROFEN 200 MG
600 TABLET ORAL ONCE
Refills: 0 | Status: DISCONTINUED | OUTPATIENT
Start: 2023-05-18 | End: 2023-05-18

## 2023-05-18 RX ORDER — ACETAMINOPHEN 500 MG
2 TABLET ORAL
Qty: 0 | Refills: 0 | DISCHARGE
Start: 2023-05-18

## 2023-05-18 RX ORDER — IBUPROFEN 200 MG
1 TABLET ORAL
Qty: 0 | Refills: 0 | DISCHARGE
Start: 2023-05-18

## 2023-05-18 RX ADMIN — Medication 650 MILLIGRAM(S): at 10:02

## 2023-05-18 RX ADMIN — HEPARIN SODIUM 5000 UNIT(S): 5000 INJECTION INTRAVENOUS; SUBCUTANEOUS at 05:15

## 2023-05-18 RX ADMIN — Medication 300 MILLIGRAM(S): at 05:14

## 2023-05-18 NOTE — DISCHARGE NOTE PROVIDER - CARE PROVIDER_API CALL
Yarelis Guzman)  Surgery  [address] [address]  Phone: [phone]  Fax: [phone]  Follow Up Time:

## 2023-05-18 NOTE — DISCHARGE NOTE PROVIDER - NSDCFUADDINST_GEN_ALL_CORE_FT
Wound Care:  - Please change dressing over wound at least once a day.  - You may shower with gentle soap. Allow the water to gently flow over wound. Do not scrub and pat dry.  - Apply dry piece of gauze over the wound and penrose, then Kerlix wrap around the gauze and use tape to secure the dressing.  - Please keep penrose drain in place until follow up with Dr. Guzman.       General Discharge Instructions:  Please resume all regular home medications unless specifically advised not to take a particular medication. Also, please take any new medications as prescribed.  Please get plenty of rest, continue to ambulate several times per day, and drink adequate amounts of fluids.   Please follow-up with your surgeon and Primary Care Provider (PCP) in 1-2 weeks.  You may shower, please do no bathing in pools or baths until follow up with Dr. Guzman    Warning Signs:  Please call your doctor if you experience the following:  *You experience new chest pain, pressure, squeezing or tightness.  *New or worsening cough, shortness of breath, or wheeze.  *If you are vomiting and cannot keep down fluids or your medications.  *You are getting dehydrated due to continued vomiting, diarrhea, or other reasons. Signs of dehydration include dry mouth, rapid heartbeat, or feeling dizzy or faint when standing.  *You see blood or dark/black material when you vomit or have a bowel movement.  *You experience burning when you urinate, have blood in your urine, or experience a discharge.  *Your pain is not improving within 8-12 hours or is not gone within 24 hours. Call or return immediately if your pain is getting worse, changes location, or moves to your chest or back.  *You have shaking chills, or fever greater than 101.5 degrees Fahrenheit or 38 degrees Celsius.  *Any change in your symptoms, or any new symptoms that concern you. Wound Care:  - Please change dressing over wound at least once a day.  - Please shower daily with gentle soap. Allow the water to gently flow over wound. Do not scrub and pat dry.  - Apply dry piece of gauze over the wound and penrose, then Kerlix wrap around the gauze, use tape to secure the dressing, and then apply ace wrap.  - Please keep penrose drain in place until follow up with Dr. Guzman.       General Discharge Instructions:  Please resume all regular home medications unless specifically advised not to take a particular medication. Also, please take any new medications as prescribed.  Please get plenty of rest, continue to ambulate several times per day, and drink adequate amounts of fluids.   Please follow-up with your surgeon and Primary Care Provider (PCP) in 1-2 weeks.  You may shower, please do no bathing in pools or baths until follow up with Dr. Guzman    Warning Signs:  Please call your doctor if you experience the following:  *You experience new chest pain, pressure, squeezing or tightness.  *New or worsening cough, shortness of breath, or wheeze.  *If you are vomiting and cannot keep down fluids or your medications.  *You are getting dehydrated due to continued vomiting, diarrhea, or other reasons. Signs of dehydration include dry mouth, rapid heartbeat, or feeling dizzy or faint when standing.  *You see blood or dark/black material when you vomit or have a bowel movement.  *You experience burning when you urinate, have blood in your urine, or experience a discharge.  *Your pain is not improving within 8-12 hours or is not gone within 24 hours. Call or return immediately if your pain is getting worse, changes location, or moves to your chest or back.  *You have shaking chills, or fever greater than 101.5 degrees Fahrenheit or 38 degrees Celsius.  *Any change in your symptoms, or any new symptoms that concern you.

## 2023-05-18 NOTE — CONSULT NOTE ADULT - SUBJECTIVE AND OBJECTIVE BOX
Patient is a 63y old  Male who presents with a chief complaint of Infected hematoma (18 May 2023 08:48)      HPI:  62yo Male pt with no PMH and PSx of left patellar tendon repair presents to the ED on 5/17 with 2 week history of increasing right medial calf swelling and tenderness. Pt states that approximately 2 weeks ago he was kicked in the shin during a jujitsu class and subsequently developed a large area of swelling. States he has been taking Ibuprofen/Tylenol since the episode with partial alleviation of the pain and has not been having difficulty ambulating. Of note, pt was seen in the ED on 5/10 at which time a POCUS demonstrated soft tissue swelling and hematoma without evidence of DVT and again on 5/15 and was subsequently discharged with the same diagnosis of soft tissue hematoma. States that since his last presentation he has noted skin effacement and some seropurlent drainage from the superior aspect of the wound. States that he was seen by his PCP today who recommended he come back to the ED today for further evaluation. On presentation, pt continues to report moderate pain to the right leg which does not radiate beyond the noted erythema. Denies any constitutional symptoms including fevers, chills, nausea, vomiting, or changes in bowel/urinary habits since the onset of symptoms. Denies difficulty ambulating, numbness or tingling of the toes, and decreased ROM.    Last colonoscopy - 5/13 - 1 polyp removed  Denies family hx of IBS, Crohn's, UC, or colon cancer.    In the ED, pt afebrile, nontachycardic, normotensive, and satting on RA. Labs significant for WBC 7.76, Hb 12.2, and an unremarkable chem panel. PE notable for a 20x6cm area of fluctuance along the anteriomedial aspect of the right calf with circumfrential erythema. No noted areas of current drainage. Tenderness to palpation does not extend beyond area of erythema. RLE U/S demonstrates no DVT and a 9.9 x 2.8 x 4.8 cm avascular complex collection with internal echoes and trace subcutaneous edema.    PMH: None  PSx: Left patellar tendon repair  Social Hx: Nonsmoker, social Etoh, no illicits  Family Hx: Noncontributory    Meds: None   (17 May 2023 22:21)      Allergies    penicillin (Rash)    Intolerances        MEDICATIONS  (STANDING):  heparin   Injectable 5000 Unit(s) SubCutaneous every 8 hours  vancomycin  IVPB 1500 milliGRAM(s) IV Intermittent every 12 hours    MEDICATIONS  (PRN):  acetaminophen     Tablet .. 650 milliGRAM(s) Oral every 6 hours PRN Moderate Pain (4 - 6)  ondansetron Injectable 4 milliGRAM(s) IV Push every 6 hours PRN Nausea  oxyCODONE    IR 5 milliGRAM(s) Oral every 6 hours PRN Severe Pain (7 - 10)      Daily     Daily     Drug Dosing Weight  Height (cm): 182.9 (14 Feb 2023 06:37)  Weight (kg): 93 (17 May 2023 15:55)  BMI (kg/m2): 27.8 (17 May 2023 15:55)  BSA (m2): 2.15 (17 May 2023 15:55)    PAST MEDICAL & SURGICAL HISTORY:  No pertinent past medical history      Traumatic rupture of left patellar tendon          FAMILY HISTORY:  No pertinent family history in first degree relatives          REVIEW OF SYSTEMS:    CONSTITUTIONAL: No fever, weight loss, or fatigue  EYES: No eye pain, visual disturbances, or discharge  ENMT:  No difficulty hearing, tinnitus, vertigo; No sinus or throat pain  NECK: No pain or stiffness  RESPIRATORY: No cough, wheezing, chills or hemoptysis; No shortness of breath  CARDIOVASCULAR: No chest pain, palpitations, dizziness, or leg swelling  GASTROINTESTINAL: No abdominal or epigastric pain. No nausea, vomiting, or hematemesis; No diarrhea or constipation. No melena or hematochezia.  GENITOURINARY: No dysuria, frequency, hematuria, or incontinence  LYMPH NODES: No enlarged glands  ENDOCRINE: No heat or cold intolerance; No hair loss  MUSCULOSKELETAL: No joint pain or swelling; No muscle, back, or extremity pain  NEUROLOGICAL: No headaches, memory loss, loss of strength, numbness, or tremors  SKIN: No itching, burning, rashes, or lesion              ICU Vital Signs Last 24 Hrs  T(C): 36.9 (18 May 2023 09:17), Max: 37.2 (17 May 2023 22:04)  T(F): 98.5 (18 May 2023 09:17), Max: 98.9 (17 May 2023 22:04)  HR: 55 (18 May 2023 09:17) (55 - 76)  BP: 146/75 (18 May 2023 09:17) (116/70 - 160/70)  BP(mean): --  ABP: --  ABP(mean): --  RR: 16 (18 May 2023 09:17) (16 - 18)  SpO2: 97% (18 May 2023 09:17) (96% - 98%)    O2 Parameters below as of 18 May 2023 09:17  Patient On (Oxygen Delivery Method): room air                I&O's Detail    17 May 2023 07:01  -  18 May 2023 07:00  --------------------------------------------------------  IN:    IV PiggyBack: 500 mL    Lactated Ringers: 40 mL    Oral Fluid: 100 mL  Total IN: 640 mL    OUT:    Voided (mL): 900 mL  Total OUT: 900 mL    Total NET: -260 mL      18 May 2023 07:01  -  18 May 2023 13:51  --------------------------------------------------------  IN:    Oral Fluid: 480 mL  Total IN: 480 mL    OUT:    Voided (mL): 650 mL  Total OUT: 650 mL    Total NET: -170 mL          PHYSICAL EXAM:    GENERAL: NAD, well-groomed, well-developed  HEAD:  Atraumatic, Normocephalic  EYES: EOMI, PERRLA, conjunctiva and sclera clear  ENMT: No tonsillar erythema, exudates, or enlargement; Moist mucous membranes, Good dentition, No lesions  NECK: Supple, No JVD, Normal thyroid  NERVOUS SYSTEM:  Alert & Oriented X3, Good concentration; Motor Strength 5/5 B/L upper and lower extremities; DTRs 2+ intact and symmetric  CHEST/LUNG: Clear to percussion bilaterally; No rales, rhonchi, wheezing, or rubs  HEART: Regular rate and rhythm; No murmurs, rubs, or gallops  ABDOMEN: Soft, Nontender, Nondistended; Bowel sounds present  EXTREMITIES:  2+ Peripheral Pulses, No clubbing, cyanosis, or edema  LYMPH: No lymphadenopathy noted  SKIN: R medial calf wound w penrose drain in place and wraps in kerlex     LABS:  CBC Full  -  ( 18 May 2023 07:39 )  WBC Count : 6.69 K/uL  RBC Count : 3.84 M/uL  Hemoglobin : 10.7 g/dL  Hematocrit : 33.8 %  Platelet Count - Automated : 257 K/uL  Mean Cell Volume : 88.0 fl  Mean Cell Hemoglobin : 27.9 pg  Mean Cell Hemoglobin Concentration : 31.7 gm/dL  Auto Neutrophil # : x  Auto Lymphocyte # : x  Auto Monocyte # : x  Auto Eosinophil # : x  Auto Basophil # : x  Auto Neutrophil % : x  Auto Lymphocyte % : x  Auto Monocyte % : x  Auto Eosinophil % : x  Auto Basophil % : x    05-18    135  |  100  |  10  ----------------------------<  111<H>  4.2   |  28  |  0.91  < from: US Duplex Venous Lower Ext Ltd, Right (05.17.23 @ 19:05) >    IMPRESSION:  1.  No evidence of right lower extremity deep venous thrombosis.  2.  Right lower leg (below the knee) complex fluid collection measuring   9.9 x 2.8 x 4.8 cm    < end of copied text >    Ca    9.1      18 May 2023 07:39  Phos  3.0     05-18  Mg     2.0     05-18    TPro  7.4  /  Alb  3.5  /  TBili  0.3  /  DBili  x   /  AST  44<H>  /  ALT  48<H>  /  AlkPhos  108  05-17    CAPILLARY BLOOD GLUCOSE        PT/INR - ( 17 May 2023 16:24 )   PT: 13.9 sec;   INR: 1.17          PTT - ( 17 May 2023 16:24 )  PTT:30.0 sec    CARDIAC MARKERS ( 17 May 2023 16:24 )  x     / x     / 119 U/L / x     / <1.0 ng/mL      Culture Results:   Moderate Staphylococcus aureus Presumptive Methicillin susceptible  Confirmation to follow within 24 hrs. Susceptibility to follow. (05-17 @ 20:52)      EKG:    ECHO, US:    RADIOLOGY:

## 2023-05-18 NOTE — DISCHARGE NOTE PROVIDER - NSDCCPCAREPLAN_GEN_ALL_CORE_FT
PRINCIPAL DISCHARGE DIAGNOSIS  Diagnosis: Cellulitis and abscess  Assessment and Plan of Treatment:

## 2023-05-18 NOTE — DISCHARGE NOTE PROVIDER - NSDCFUADDAPPT_GEN_ALL_CORE_FT
Please follow up with Dr. Guzman in one week; you may call the office to make an appointment at your earliest convenience.  You have been prescribed oral antibiotics. Please be sure to complete the entire course as directed.     Please follow up with Dr. Guzman in one week; you may call the office to make an appointment at your earliest convenience.  You have been prescribed oral antibiotics. Please be sure to complete the entire course as directed.      Please follow up with Dr. Guzman in one week; you may call the office to make an appointment at your earliest convenience.  You have been prescribed oral antibiotics. Please be sure to complete the entire course as directed.     You may take tylenol every 6 hours as needed for pain, do not exceed 4,000mg of tylenol in 24 hours. You may also take ibuprofen every 6 hours for pain control.

## 2023-05-18 NOTE — DISCHARGE NOTE NURSING/CASE MANAGEMENT/SOCIAL WORK - PATIENT PORTAL LINK FT
You can access the FollowMyHealth Patient Portal offered by [facility] by registering at the following website: [email or web address]. By joining [facility]’s FollowMyHealth portal, you will also be able to view your health information using other applications (apps) compatible with our system.

## 2023-05-18 NOTE — DISCHARGE NOTE NURSING/CASE MANAGEMENT/SOCIAL WORK - NSDCFUADDAPPT_GEN_ALL_CORE_FT
Please follow up with Dr. Guzman in one week; you may call the office to make an appointment at your earliest convenience.  You have been prescribed oral antibiotics. Please be sure to complete the entire course as directed.     You may take tylenol every 6 hours as needed for pain, do not exceed 4,000mg of tylenol in 24 hours. You may also take ibuprofen every 6 hours for pain control.

## 2023-05-18 NOTE — DISCHARGE NOTE PROVIDER - HOSPITAL COURSE
64yo Male pt with no PMH and PSx of left patellar tendon repair presents to the ED on 5/17 with 2 week history of increasing right anteriomedial calf swelling and tenderness after trauma to the affected region. Pt afebrile, HDS. Labs significant for WBC 7.76, Hb 12.2, and an unremarkable chem panel. PE notable for a 20x6cm area of tender fluctuance along the anteriomedial aspect of the right calf with circumferential erythema. RLE U/S demonstrates no DVT and a 9.9 x 2.8 x 4.8 cm avascular complex collection with internal echoes and trace subcutaneous edema. Subjective history, physical exam, and radiographic findings concerning for infected hematoma of the right calf with localized cellulitis. Now s/p I&D with evacuation of approximately 75cc purulent clot and placement of penrose drain on 5/17. The rest of his hospital course was unremarkable with advancement of diet, wound care, and pain control. On day of discharge patient was stable to be d/c'd home.

## 2023-05-18 NOTE — DISCHARGE NOTE PROVIDER - NSDCFUSCHEDAPPT_GEN_ALL_CORE_FT
Jose De Jesus Araujo  [facility] Physician [facility]  [facility] [address]  Scheduled Appointment: 06/07/2023

## 2023-05-18 NOTE — PROGRESS NOTE ADULT - SUBJECTIVE AND OBJECTIVE BOX
STATUS POST:  I&d of r. medial calf     SUBJECTIVE: Patient seen and examined bedside by chief resident. calf pain controlled. eager to go home if wound is looking better     heparin   Injectable 5000 Unit(s) SubCutaneous every 8 hours  vancomycin  IVPB 1500 milliGRAM(s) IV Intermittent every 12 hours      Vital Signs Last 24 Hrs  T(C): 37.1 (18 May 2023 05:05), Max: 37.2 (17 May 2023 22:04)  T(F): 98.7 (18 May 2023 05:05), Max: 98.9 (17 May 2023 22:04)  HR: 61 (18 May 2023 05:05) (56 - 76)  BP: 116/70 (18 May 2023 05:05) (116/70 - 160/70)  BP(mean): --  RR: 18 (18 May 2023 05:05) (18 - 18)  SpO2: 97% (18 May 2023 05:05) (96% - 98%)    Parameters below as of 18 May 2023 05:05  Patient On (Oxygen Delivery Method): room air      I&O's Detail    17 May 2023 07:01  -  18 May 2023 07:00  --------------------------------------------------------  IN:    IV PiggyBack: 500 mL    Lactated Ringers: 40 mL    Oral Fluid: 100 mL  Total IN: 640 mL    OUT:    Voided (mL): 900 mL  Total OUT: 900 mL    Total NET: -260 mL          General: NAD, resting comfortably in bed  C/V: NSR  Pulm: Nonlabored breathing, no respiratory distress  Abd: soft, NT/ND.  Extrem: right medical calf wound with penrose drain in place, draining, packing that was placed for hemostasis was removed, no active bleeding, continues to drain serosang fluid, dry gauze and kerlix wrap replaced.         LABS:                        12.2   7.76  )-----------( 268      ( 17 May 2023 16:24 )             37.6     05-17    138  |  100  |  11  ----------------------------<  95  SEE NOTE   |  27  |  0.89    Ca    9.0      17 May 2023 16:24    TPro  7.4  /  Alb  3.5  /  TBili  0.3  /  DBili  x   /  AST  44<H>  /  ALT  48<H>  /  AlkPhos  108  05-17    PT/INR - ( 17 May 2023 16:24 )   PT: 13.9 sec;   INR: 1.17          PTT - ( 17 May 2023 16:24 )  PTT:30.0 sec      RADIOLOGY & ADDITIONAL STUDIES:

## 2023-05-18 NOTE — CONSULT NOTE ADULT - ASSESSMENT
64 yo Male pt with no PMH and PSx of left patellar tendon repair presents to the ED on 5/17 with 2 week history of increasing right anteriomedial calf swelling and tenderness after trauma to the affected region. Pt afebrile, HDS. Labs significant for WBC 7.76, Hb 12.2, and an unremarkable chem panel. PE notable for a 20x6cm area of tender fluctuance along the anteriomedial aspect of the right calf with circumferential erythema. RLE U/S demonstrates no DVT and a 9.9 x 2.8 x 4.8 cm avascular complex collection with internal echoes and trace subcutaneous edema. Subjective history, physical exam, and radiographic findings concerning for infected hematoma of the right calf with localized cellulitis. Now s/p I&D  +placement of penrose drain (5/17)     #infected hematoma   #cellulitis   s/p Now s/p I&D  +placement of penrose drain   started on vancomycin   cx + g+ cocci- pending sens - can likely de-escalate to bactrim or doxy for mrsa coverage   rest of the management per primary team    #anemia   hgb 12 on admission   down trended to 10.7 s/p I & D - likely 2.2 small blood loss 2/2 hematoma evacuation     #dvt ppx

## 2023-05-18 NOTE — DISCHARGE NOTE PROVIDER - NSDCMRMEDTOKEN_GEN_ALL_CORE_FT
acetaminophen 325 mg oral tablet: 2 tab(s) orally every 6 hours As needed Moderate Pain (4 - 6)  ibuprofen 600 mg oral tablet: 1 tab(s) orally once  sulfamethoxazole-trimethoprim 800 mg-160 mg oral tablet: 1 tab(s) orally every 12 hours antibiotic

## 2023-05-18 NOTE — PROGRESS NOTE ADULT - ASSESSMENT
62yo Male pt with no PMH and PSx of left patellar tendon repair presents to the ED on 5/17 with 2 week history of increasing right anteriomedial calf swelling and tenderness after trauma to the affected region. Pt afebrile, HDS. Labs significant for WBC 7.76, Hb 12.2, and an unremarkable chem panel. PE notable for a 20x6cm area of tender fluctuance along the anteriomedial aspect of the right calf with circumferential erythema. RLE U/S demonstrates no DVT and a 9.9 x 2.8 x 4.8 cm avascular complex collection with internal echoes and trace subcutaneous edema. Subjective history, physical exam, and radiographic findings concerning for infected hematoma of the right calf with localized cellulitis. Now s/p I&D with evacuation of approximately 75cc purulent clot and placement of penrose drain (5/17).    Plan:  Admit to [facility], Team 4, Dr. Granados  Regular diet  Pain/nausea control PRN  Vancomycin  F/U wound culture  HSQ/SCDs/OOB/IS  AM labs

## 2023-05-19 LAB
-  CLINDAMYCIN: SIGNIFICANT CHANGE UP
-  ERYTHROMYCIN: SIGNIFICANT CHANGE UP
-  LINEZOLID: SIGNIFICANT CHANGE UP
-  OXACILLIN: SIGNIFICANT CHANGE UP
-  RIFAMPIN: SIGNIFICANT CHANGE UP
-  TRIMETHOPRIM/SULFAMETHOXAZOLE: SIGNIFICANT CHANGE UP
-  VANCOMYCIN: SIGNIFICANT CHANGE UP
CULTURE RESULTS: SIGNIFICANT CHANGE UP
METHOD TYPE: SIGNIFICANT CHANGE UP
ORGANISM # SPEC MICROSCOPIC CNT: SIGNIFICANT CHANGE UP
ORGANISM # SPEC MICROSCOPIC CNT: SIGNIFICANT CHANGE UP
SPECIMEN SOURCE: SIGNIFICANT CHANGE UP

## 2023-05-23 ENCOUNTER — APPOINTMENT (OUTPATIENT)
Dept: BARIATRICS | Facility: CLINIC | Age: 63
End: 2023-05-23
Payer: MEDICAID

## 2023-05-23 DIAGNOSIS — D64.9 ANEMIA, UNSPECIFIED: ICD-10-CM

## 2023-05-23 DIAGNOSIS — L02.415 CUTANEOUS ABSCESS OF RIGHT LOWER LIMB: ICD-10-CM

## 2023-05-23 DIAGNOSIS — B96.89 OTHER SPECIFIED BACTERIAL AGENTS AS THE CAUSE OF DISEASES CLASSIFIED ELSEWHERE: ICD-10-CM

## 2023-05-23 DIAGNOSIS — L03.115 CELLULITIS OF RIGHT LOWER LIMB: ICD-10-CM
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
## 2023-05-23 DIAGNOSIS — Z88.0 ALLERGY STATUS TO PENICILLIN: ICD-10-CM

## 2023-05-23 PROCEDURE — 99213 OFFICE O/P EST LOW 20 MIN: CPT

## 2023-05-23 NOTE — ASSESSMENT
[FreeTextEntry1] : Recent RLE trauma with infected hematoma requiring I+D and antibiotics. overall steady improvements.

## 2023-05-23 NOTE — HISTORY OF PRESENT ILLNESS
[de-identified] : Mr. Bay is a 62 y/o man who recent sustained a trauma to his RLE. He had an infected hematoma that required bedside I+D and antibiotics. since discharge from the hospital he reports he has been able to do ok with daily dressing changes and showering. He has a routine down. He reports less pain and swelling, still with some discoloration. some thin drainage from the two open areas. He has no longer required pain medication. no fevers or chills. able to bear weight. he is hoping to get back to the gym soon.

## 2023-05-23 NOTE — PLAN
[FreeTextEntry1] : Reviewed wound care plan. RTC 1 week. \par Will extend antibiotics for another week since there is still some tenderness and swelling. \par reviewed warning signs that would indicate need for more urgent re-evaluation.

## 2023-05-23 NOTE — PHYSICAL EXAM
[JVD] : no jugular venous distention  [Respiratory Effort] : normal respiratory effort [Normal Rate and Rhythm] : normal rate and rhythm [de-identified] : well, NAD [de-identified] : NCAT [de-identified] : RLE dressing removed. minimal yellow thin drainage. leg swollen compared to left, much less than when he was discharged last week. decreased redness. there is still tenderness to the RLE in general with no focal areas that are worse than others. penrose removed.

## 2023-05-30 ENCOUNTER — APPOINTMENT (OUTPATIENT)
Dept: BARIATRICS | Facility: CLINIC | Age: 63
End: 2023-05-30
Payer: MEDICAID

## 2023-05-30 VITALS
HEART RATE: 64 BPM | OXYGEN SATURATION: 96 % | WEIGHT: 210 LBS | HEIGHT: 72 IN | BODY MASS INDEX: 28.44 KG/M2 | SYSTOLIC BLOOD PRESSURE: 130 MMHG | TEMPERATURE: 98.8 F | DIASTOLIC BLOOD PRESSURE: 70 MMHG

## 2023-05-30 PROCEDURE — 99213 OFFICE O/P EST LOW 20 MIN: CPT

## 2023-06-06 ENCOUNTER — APPOINTMENT (OUTPATIENT)
Dept: BARIATRICS | Facility: CLINIC | Age: 63
End: 2023-06-06
Payer: MEDICAID

## 2023-06-06 VITALS
WEIGHT: 206 LBS | SYSTOLIC BLOOD PRESSURE: 146 MMHG | TEMPERATURE: 97.3 F | HEART RATE: 80 BPM | DIASTOLIC BLOOD PRESSURE: 75 MMHG | BODY MASS INDEX: 27.9 KG/M2 | OXYGEN SATURATION: 98 % | HEIGHT: 72 IN

## 2023-06-06 PROCEDURE — 99212 OFFICE O/P EST SF 10 MIN: CPT

## 2023-06-06 RX ORDER — SULFAMETHOXAZOLE AND TRIMETHOPRIM 800; 160 MG/1; MG/1
800-160 TABLET ORAL TWICE DAILY
Qty: 14 | Refills: 0 | Status: DISCONTINUED | COMMUNITY
Start: 2023-05-23 | End: 2023-06-06

## 2023-06-06 NOTE — PHYSICAL EXAM
[Respiratory Effort] : normal respiratory effort [Normal Rate and Rhythm] : normal rate and rhythm [de-identified] : NAD [de-identified] : RLE with 2 open areas, roughly 2 x 1 cm each, both with some superficial fibrinous exudate. decreased edema, no redness, silver nitrate applied.

## 2023-06-06 NOTE — PLAN
[FreeTextEntry1] : silver nitrate applied today. \par wound care instructions reviewed. RTC 2 weeks.

## 2023-06-07 ENCOUNTER — APPOINTMENT (OUTPATIENT)
Dept: HEART AND VASCULAR | Facility: CLINIC | Age: 63
End: 2023-06-07
Payer: MEDICAID

## 2023-06-07 ENCOUNTER — NON-APPOINTMENT (OUTPATIENT)
Age: 63
End: 2023-06-07

## 2023-06-07 VITALS
DIASTOLIC BLOOD PRESSURE: 60 MMHG | HEIGHT: 72 IN | BODY MASS INDEX: 27.9 KG/M2 | HEART RATE: 60 BPM | WEIGHT: 206 LBS | TEMPERATURE: 97.6 F | OXYGEN SATURATION: 98 % | SYSTOLIC BLOOD PRESSURE: 112 MMHG

## 2023-06-07 DIAGNOSIS — Z00.00 ENCOUNTER FOR GENERAL ADULT MEDICAL EXAMINATION W/OUT ABNORMAL FINDINGS: ICD-10-CM

## 2023-06-07 PROCEDURE — 93000 ELECTROCARDIOGRAM COMPLETE: CPT

## 2023-06-07 PROCEDURE — 36415 COLL VENOUS BLD VENIPUNCTURE: CPT

## 2023-06-07 PROCEDURE — 99386 PREV VISIT NEW AGE 40-64: CPT

## 2023-06-07 NOTE — PHYSICAL EXAM
[Well Developed] : well developed [Well Nourished] : well nourished [No Acute Distress] : no acute distress [Normal Conjunctiva] : normal conjunctiva [Normal Venous Pressure] : normal venous pressure [No Carotid Bruit] : no carotid bruit [Normal S1, S2] : normal S1, S2 [No Murmur] : no murmur [No Rub] : no rub [No Gallop] : no gallop [Clear Lung Fields] : clear lung fields [Good Air Entry] : good air entry [No Respiratory Distress] : no respiratory distress  [Soft] : abdomen soft [Non Tender] : non-tender [No Masses/organomegaly] : no masses/organomegaly [Normal Bowel Sounds] : normal bowel sounds [Normal Gait] : normal gait [No Edema] : no edema [No Cyanosis] : no cyanosis [No Clubbing] : no clubbing [No Rash] : no rash [No Focal Deficits] : no focal deficits [Moves all extremities] : moves all extremities [Normal Speech] : normal speech [Alert and Oriented] : alert and oriented [Normal memory] : normal memory

## 2023-06-07 NOTE — DISCUSSION/SUMMARY
[FreeTextEntry1] : stable exam,labs in office\par psa due\par colon utd, f/u report from 2 weeks ago [EKG obtained to assist in diagnosis and management of assessed problem(s)] : EKG obtained to assist in diagnosis and management of assessed problem(s)

## 2023-06-08 LAB
ALBUMIN SERPL ELPH-MCNC: 4.4 G/DL
ALP BLD-CCNC: 73 U/L
ALT SERPL-CCNC: 17 U/L
ANION GAP SERPL CALC-SCNC: 11 MMOL/L
APPEARANCE: CLEAR
AST SERPL-CCNC: 33 U/L
BILIRUB SERPL-MCNC: 0.3 MG/DL
BILIRUBIN URINE: NEGATIVE
BLOOD URINE: NEGATIVE
BUN SERPL-MCNC: 13 MG/DL
CALCIUM SERPL-MCNC: 9.8 MG/DL
CHLORIDE SERPL-SCNC: 100 MMOL/L
CHOLEST SERPL-MCNC: 227 MG/DL
CO2 SERPL-SCNC: 27 MMOL/L
COLOR: NORMAL
CREAT SERPL-MCNC: 1.12 MG/DL
CREAT SPEC-SCNC: 113 MG/DL
EGFR: 75 ML/MIN/1.73M2
ESTIMATED AVERAGE GLUCOSE: 131 MG/DL
GLUCOSE QUALITATIVE U: NEGATIVE MG/DL
GLUCOSE SERPL-MCNC: 111 MG/DL
HBA1C MFR BLD HPLC: 6.2 %
HCT VFR BLD CALC: 38.8 %
HDLC SERPL-MCNC: 55 MG/DL
HGB BLD-MCNC: 12.3 G/DL
KETONES URINE: NEGATIVE MG/DL
LDLC SERPL CALC-MCNC: 152 MG/DL
LEUKOCYTE ESTERASE URINE: NEGATIVE
MCHC RBC-ENTMCNC: 28 PG
MCHC RBC-ENTMCNC: 31.7 GM/DL
MCV RBC AUTO: 88.4 FL
MICROALBUMIN 24H UR DL<=1MG/L-MCNC: <1.2 MG/DL
MICROALBUMIN/CREAT 24H UR-RTO: NORMAL MG/G
NITRITE URINE: NEGATIVE
NONHDLC SERPL-MCNC: 173 MG/DL
PH URINE: 6
PLATELET # BLD AUTO: 108 K/UL
POTASSIUM SERPL-SCNC: 4.5 MMOL/L
PROT SERPL-MCNC: 7.4 G/DL
PROTEIN URINE: NEGATIVE MG/DL
PSA SERPL-MCNC: 1.88 NG/ML
RBC # BLD: 4.39 M/UL
RBC # FLD: 14.7 %
SODIUM SERPL-SCNC: 138 MMOL/L
SPECIFIC GRAVITY URINE: 1.02
TRIGL SERPL-MCNC: 105 MG/DL
TSH SERPL-ACNC: 1.53 UIU/ML
UROBILINOGEN URINE: 0.2 MG/DL
WBC # FLD AUTO: 2.88 K/UL

## 2023-06-08 NOTE — ED PROVIDER NOTE - NS ED ATTENDING STATEMENT MOD
Attending Only Itraconazole Counseling:  I discussed with the patient the risks of itraconazole including but not limited to liver damage, nausea/vomiting, neuropathy, and severe allergy.  The patient understands that this medication is best absorbed when taken with acidic beverages such as non-diet cola or ginger ale.  The patient understands that monitoring is required including baseline LFTs and repeat LFTs at intervals.  The patient understands that they are to contact us or the primary physician if concerning signs are noted.

## 2023-06-20 ENCOUNTER — APPOINTMENT (OUTPATIENT)
Dept: BARIATRICS | Facility: CLINIC | Age: 63
End: 2023-06-20
Payer: MEDICAID

## 2023-06-20 VITALS
DIASTOLIC BLOOD PRESSURE: 77 MMHG | HEART RATE: 80 BPM | SYSTOLIC BLOOD PRESSURE: 137 MMHG | TEMPERATURE: 97.1 F | WEIGHT: 315 LBS | OXYGEN SATURATION: 96 % | HEIGHT: 72 IN | BODY MASS INDEX: 42.66 KG/M2

## 2023-06-20 DIAGNOSIS — S81.801D UNSPECIFIED OPEN WOUND, RIGHT LOWER LEG, SUBSEQUENT ENCOUNTER: ICD-10-CM

## 2023-06-20 PROCEDURE — 99212 OFFICE O/P EST SF 10 MIN: CPT

## 2024-02-10 NOTE — ED PROVIDER NOTE - OBJECTIVE STATEMENT
PHYSICAL THERAPY EVALUATION - INPATIENT     Room Number: 356/356-A  Evaluation Date: 2/10/2024  Type of Evaluation: Initial  Physician Order: PT Eval and Treat    Presenting Problem: abdominal pain  Co-Morbidities : HTN/HL, MDD, asthma, seizure disorder, OA  Reason for Therapy: Mobility Dysfunction and Discharge Planning    CT abdomen and pelvis 2/7/24:  CONCLUSION:    1. Loculated fluid collections around the left psoas muscle are noted and most likely a resolved fluid/hemorrhage from recent surgery.  Correlation with clinical findings is necessary to exclude infected fluid collection.   2. Moderate to marked distension right colon and cecum with large amount of fecal debris consistent with constipation.   3. Luminal distention of gallbladder is noted without specific evidence of cholecystitis or calcified cholelithiasis.   4. Small left pleural effusion with dependent atelectasis left lower lobe.       Procedure report 2/9/24:  Procedure: CT guided retroperitoneal abscess drainage     PHYSICAL THERAPY ASSESSMENT   PT orders received, chart reviewed, and RN approved PT session. Pt admitted on 2/7/24 due to back pain that radiates to the front, imagine reveals fulid collection around the L psoas muscle. Pt underwent drainage of abscess on 2/9/24. Patient is currently functioning below baseline with bed mobility, transfers, gait, stair negotiation, maintaining seated position, standing prolonged periods, and performing household tasks.  Prior to admission, patient's baseline is pt ambulating with RW, independent with ADL (since surgery on 1/18/24).  Patient is requiring minimal assist as a result of the following impairments: decreased functional strength, decreased endurance/aerobic capacity, pain, decreased muscular endurance, and difficulty maintaining precautions.  Physical Therapy will continue to follow for duration of hospitalization.    Patient will benefit from continued skilled PT Services at  discharge to promote functional independence and safety with additional support and return home with home health PT.    PLAN  PT Treatment Plan: Bed mobility;Body mechanics;Endurance;Energy conservation;Patient education;Family education;Gait training;Strengthening;Stair training;Transfer training;Balance training  Rehab Potential : Good  Frequency (Obs): Daily  Number of Visits to Meet Established Goals: 5      CURRENT GOALS    Goal #1 Patient is able to demonstrate supine - sit EOB @ level: modified independent     Goal #2 Patient is able to demonstrate transfers Sit to/from Stand at assistance level: modified independent     Goal #3 Patient is able to ambulate 150 feet with assist device: walker - rolling at assistance level: modified independent     Goal #4 Pt to ascend/descend 2 steps with supervision.    Goal #5    Goal #6    Goal Comments: Goals established on 2/10/2024      PHYSICAL THERAPY MEDICAL/SOCIAL HISTORY    History related to current admission: Patient is a 74 year old female admitted on 2/7/2024 from home for back pain that radiates to the front.  Pt diagnosed with L psoas abscess . Recent surgery 1/18/24 as follows: L2-L3 fusion, removal of hardware L3-L5 and re instrumentation. Pt DC to home on 1/20/24.      HOME SITUATION  Type of Home: House   Home Layout: Two level;Able to live on main level (has a basement with full flight of stairs)  Stairs to Enter : 2  Railing: Yes  Stairs to Bedroom: 0       Lives With: Spouse  Drives: Yes  Patient Owned Equipment: Rolling walker  Patient Regularly Uses: Reading glasses    Prior Level of [employer]: Prior to lumbar surgery, pt was using a can, and sometimes RW. Pt was Mod I for ADL. Pt uses electric scooter at the store. Since surgery, pt is using RW for ambulation, and Mod I for ADL.     SUBJECTIVE  Pt is pleasant and cooperative.       OBJECTIVE  Precautions: Bed/chair alarm;Spine  Fall Risk: Standard fall risk    WEIGHT BEARING RESTRICTION                    PAIN ASSESSMENT  Rating: Unable to rate  Location: L anterior hip, L low back/glut  Management Techniques: Activity promotion;Relaxation;Repositioning    COGNITION  Overall Cognitive Status:  WFL - within functional limits    RANGE OF MOTION AND STRENGTH ASSESSMENT  Upper extremity ROM and strength are within functional limits     Lower extremity ROM is within functional limits     Lower extremity strength is within functional limits L hip flexion/knee extension no MMT due to pain        BALANCE  Static Sitting: Good  Dynamic Sitting: Good  Static Standing: Poor +  Dynamic Standing: Poor +    ADDITIONAL TESTS                                    ACTIVITY TOLERANCE  Pulse: 92  Heart Rate Source: Monitor                   O2 WALK  Oxygen Therapy  SPO2% on Oxygen at Rest: 3  At rest oxygen flow (liters per minute): 92    NEUROLOGICAL FINDINGS                        AM-PAC '6-Clicks' INPATIENT SHORT FORM - BASIC MOBILITY  How much difficulty does the patient currently have...  Patient Difficulty: Turning over in bed (including adjusting bedclothes, sheets and blankets)?: A Little   Patient Difficulty: Sitting down on and standing up from a chair with arms (e.g., wheelchair, bedside commode, etc.): A Little   Patient Difficulty: Moving from lying on back to sitting on the side of the bed?: A Little   How much help from another person does the patient currently need...   Help from Another: Moving to and from a bed to a chair (including a wheelchair)?: A Little   Help from Another: Need to walk in hospital room?: A Little   Help from Another: Climbing 3-5 steps with a railing?: A Lot       AM-PAC Score:  Raw Score: 17   Approx Degree of Impairment: 50.57%   Standardized Score (AM-PAC Scale): 42.13   CMS Modifier (G-Code): CK    FUNCTIONAL ABILITY STATUS  Gait Assessment   Functional Mobility/Gait Assessment  Gait Assistance: Contact guard assist  Distance (ft): 3  Assistive Device: Rolling walker    Skilled Therapy  Provided     Bed Mobility:  Rolling: Min A  Supine to sit: Min A   Sit to supine: supervision     Transfer Mobility:  Sit to stand: supervision   Stand to sit: supervision  Gait = supervision    Therapist's Comments: pt lying in bed and agreeable to PT. Pt educated in spine precautions, log rolling technique with bed mobility. Pt instructed in proper breathing techniques during mobility. Pt cued for breathing throughout. Pt able to sit EOB x 8 minutes, declined sitting up in chair, as its too painful on the Left hip/groin. Pt instructed in proper hand placement with sit<>stand. Pt able to take side steps to achieve better position in bed. Pt cued for body mechanics for bed mobility. Vitals assessed and monitored throughout and WNL. Advised pt to get up to EOB with RN staff at least time times today. Pt in understanding. Bed alarm on. RN notified of session.     Exercise/Education Provided:  Bed mobility  Body mechanics  Energy conservation  Functional activity tolerated  Gait training  Transfer training    Patient End of Session: In bed;Needs met;Call light within reach;RN aware of session/findings;Bracing education provided per handout;All patient questions and concerns addressed;Alarm set      Patient Evaluation Complexity Level:  History High - 3 or more personal factors and/or co-morbidities   Examination of body systems Moderate - addressing a total of 3 or more elements   Clinical Presentation Moderate - Evolving   Clinical Decision Making Moderate - Evolving       PT Session Time: 40 minutes  Gait Trainin minutes  Therapeutic Activity: 20 minutes  Neuromuscular Re-education: 0 minutes  Therapeutic Exercise: 0 minutes           The pt is a 64 y/o M, who presents to ED c/o R leg swelling x 1 wk after getting kicked during ju[employer]igitu. Pt has been applying heat and massaging area, here w/concern about swelling. Denies pain, decreased ROM, numbness or tingling to toes, fevers, chills, breaks in skin.

## 2024-04-25 NOTE — ED ADULT TRIAGE NOTE - NS ED TRIAGE AVPU SCALE
Alert-The patient is alert, awake and responds to voice. The patient is oriented to time, place, and person. The triage nurse is able to obtain subjective information.
Dr. Michelle Frost ([facility]) [phone]

## 2024-06-03 ENCOUNTER — EMERGENCY (EMERGENCY)
Facility: HOSPITAL | Age: 64
LOS: 1 days | Discharge: HOME | End: 2024-06-03
Attending: EMERGENCY MEDICINE | Admitting: EMERGENCY MEDICINE
Payer: COMMERCIAL

## 2024-06-03 VITALS
HEIGHT: 72 IN | HEART RATE: 59 BPM | OXYGEN SATURATION: 96 % | DIASTOLIC BLOOD PRESSURE: 73 MMHG | WEIGHT: 218.04 LBS | RESPIRATION RATE: 15 BRPM | TEMPERATURE: 98 F | SYSTOLIC BLOOD PRESSURE: 134 MMHG

## 2024-06-03 VITALS
DIASTOLIC BLOOD PRESSURE: 75 MMHG | TEMPERATURE: 98 F | HEART RATE: 59 BPM | RESPIRATION RATE: 18 BRPM | OXYGEN SATURATION: 99 % | SYSTOLIC BLOOD PRESSURE: 135 MMHG

## 2024-06-03 DIAGNOSIS — Z88.0 ALLERGY STATUS TO PENICILLIN: ICD-10-CM

## 2024-06-03 DIAGNOSIS — S86.812A STRAIN OF OTHER MUSCLE(S) AND TENDON(S) AT LOWER LEG LEVEL, LEFT LEG, INITIAL ENCOUNTER: Chronic | ICD-10-CM

## 2024-06-03 DIAGNOSIS — R22.2 LOCALIZED SWELLING, MASS AND LUMP, TRUNK: ICD-10-CM

## 2024-06-03 PROCEDURE — 93010 ELECTROCARDIOGRAM REPORT: CPT

## 2024-06-03 PROCEDURE — 71046 X-RAY EXAM CHEST 2 VIEWS: CPT

## 2024-06-03 PROCEDURE — 99285 EMERGENCY DEPT VISIT HI MDM: CPT

## 2024-06-03 PROCEDURE — 99284 EMERGENCY DEPT VISIT MOD MDM: CPT | Mod: 25

## 2024-06-03 PROCEDURE — 76882 US LMTD JT/FCL EVL NVASC XTR: CPT

## 2024-06-03 PROCEDURE — 71046 X-RAY EXAM CHEST 2 VIEWS: CPT | Mod: 26

## 2024-06-03 PROCEDURE — 93005 ELECTROCARDIOGRAM TRACING: CPT

## 2024-06-03 NOTE — ED PROVIDER NOTE - OBJECTIVE STATEMENT
64 y.o m with no sig pmh presents to ED with palpable mass on right chest wall.  Pt states he noticed it a month ago and it hasn't resolved.  Denies increased size, pain, or other associated symptoms of shortness of breath or chest pain.  No fever or chills.  NO unintentional weight loss.

## 2024-06-03 NOTE — ED PROVIDER NOTE - PHYSICAL EXAMINATION
VITAL SIGNS: I have reviewed nursing notes and confirm.  CONSTITUTIONAL: Well-developed; well-nourished; in no acute distress.  SKIN: 3mm palpable mobile mass right chest wall - deep - no erythema, drainage, tenderness or fluctuance  HEAD: Normocephalic; atraumatic.  EYES: PERRL, EOM intact; conjunctiva and sclera clear.  ENT: No nasal discharge; airway clear.  NECK: Supple; non tender.  CARD: S1, S2 normal; no murmurs, gallops, or rubs. Regular rate and rhythm.  RESP: No wheezes, rales or rhonchi.  ABD: Normal bowel sounds; soft; non-distended; non-tender; no hepatosplenomegaly.  EXT: Normal ROM. No clubbing, cyanosis or edema.  LYMPH: No acute cervical adenopathy.  NEURO: Alert, oriented. Grossly unremarkable.  PSYCH: Cooperative, appropriate.

## 2024-06-03 NOTE — ED PROVIDER NOTE - CLINICAL SUMMARY MEDICAL DECISION MAKING FREE TEXT BOX
64 y.o m with no sig pmh presents to ED with palpable mass on right chest wall.  Pt states he noticed it a month ago and it hasn't resolved.  Denies increased size, pain, or other associated symptoms of shortness of breath or chest pain.  No fever or chills.  NO unintentional weight loss.    VSS  PE - + palpable small mobile mass no erythema or fluctuance  CXR neg, ekg neg, US no visible mass or cystic structure  Clinically lump most c/w lipoma - pt aware this is not a definitive diagnosis and if lump grows or becomes bothersome should follow up with PMD for definitive diagnosis including biopsy.

## 2024-06-03 NOTE — ED PROVIDER NOTE - NSFOLLOWUPINSTRUCTIONS_ED_ALL_ED_FT
Follow up with Dr. Manning as scheduled and as needed.  Follow up for possible further workup if area of swelling becomes bigger or painful.  Stay well and feel better.      Lipoma  A person with a benign tumor (lipoma) on the shoulder and neck area.  A lipoma is a noncancerous (benign) tumor that is made up of fat cells. This is a very common type of soft-tissue growth. Lipomas are usually found under the skin (subcutaneous). They may occur in any tissue of the body that contains fat. Common areas for lipomas to appear include the back, arms, shoulders, buttocks, and thighs.    Lipomas grow slowly, and they are usually painless. Most lipomas do not cause problems and do not require treatment.    What are the causes?  The cause of this condition is not known.    What increases the risk?  You are more likely to develop this condition if:  You are 40–60 years old.  You have a family history of lipomas.  What are the signs or symptoms?  A lipoma usually appears as a small, round bump under the skin. In most cases, the lump will:  Feel soft or rubbery.  Not cause pain or other symptoms.  However, if a lipoma is located in an area where it pushes on nerves, it can become painful or cause other symptoms.    How is this diagnosed?  A lipoma can usually be diagnosed with a physical exam. You may also have tests to confirm the diagnosis and to rule out other conditions. Tests may include:  Imaging tests, such as a CT scan or an MRI.  Removal of a tissue sample to be looked at under a microscope (biopsy).  How is this treated?  Treatment for this condition depends on the size of the lipoma and whether it is causing any symptoms.  For small lipomas that are not causing problems, no treatment is needed.  If a lipoma is bigger or it causes problems, surgery may be done to remove the lipoma. Lipomas can also be removed to improve appearance. Most often, the procedure is done after applying a medicine that numbs the area (local anesthetic).  Liposuction may be done to reduce the size of the lipoma before it is removed through surgery, or it may be done to remove the lipoma. Lipomas are removed with this method to limit incision size and scarring. A liposuction tube is inserted through a small incision into the lipoma, and the contents of the lipoma are removed through the tube with suction.  Follow these instructions at home:  Watch your lipoma for any changes.  Keep all follow-up visits. This is important.  Where to find more information  OrthoInfo: orthoinfo.[facility].org  Contact a health care provider if:  Your lipoma becomes larger or hard.  Your lipoma becomes painful, red, or increasingly swollen. These could be signs of infection or a more serious condition.  Get help right away if:  You develop tingling or numbness in an area near the lipoma. This could indicate that the lipoma is causing nerve damage.  Summary  A lipoma is a noncancerous tumor that is made up of fat cells.  Most lipomas do not cause problems and do not require treatment.  If a lipoma is bigger or it causes problems, surgery may be done to remove the lipoma.  Contact a health care provider if your lipoma becomes larger or hard, or if it becomes painful, red, or increasingly swollen. These could be signs of infection or a more serious condition.

## 2024-09-25 NOTE — HISTORY OF PRESENT ILLNESS
Dr. Sneed notified of PTT result via communication  with Dr. Olivarez   [de-identified] : Mr. Bay presents for follow up evaluation of his RLE wound.\par Ongoing improvement in swelling. minimal pain and decreased ttp. \par reports minimal drainage, back at the gym again, two I+D sites still open however.

## 2025-01-29 ENCOUNTER — EMERGENCY (EMERGENCY)
Facility: HOSPITAL | Age: 65
LOS: 1 days | Discharge: HOME | End: 2025-01-29
Admitting: STUDENT IN AN ORGANIZED HEALTH CARE EDUCATION/TRAINING PROGRAM
Payer: COMMERCIAL

## 2025-01-29 VITALS
HEART RATE: 53 BPM | OXYGEN SATURATION: 97 % | RESPIRATION RATE: 18 BRPM | SYSTOLIC BLOOD PRESSURE: 157 MMHG | HEIGHT: 72 IN | DIASTOLIC BLOOD PRESSURE: 74 MMHG | TEMPERATURE: 97 F

## 2025-01-29 DIAGNOSIS — S86.812A STRAIN OF OTHER MUSCLE(S) AND TENDON(S) AT LOWER LEG LEVEL, LEFT LEG, INITIAL ENCOUNTER: Chronic | ICD-10-CM

## 2025-01-29 PROCEDURE — 73030 X-RAY EXAM OF SHOULDER: CPT

## 2025-01-29 PROCEDURE — 99283 EMERGENCY DEPT VISIT LOW MDM: CPT | Mod: 25

## 2025-01-29 PROCEDURE — 99284 EMERGENCY DEPT VISIT MOD MDM: CPT

## 2025-01-29 PROCEDURE — 73030 X-RAY EXAM OF SHOULDER: CPT | Mod: 26,RT

## 2025-01-29 RX ORDER — LIDOCAINE 50 MG/G
1 OINTMENT TOPICAL ONCE
Refills: 0 | Status: COMPLETED | OUTPATIENT
Start: 2025-01-29 | End: 2025-01-29

## 2025-01-29 RX ORDER — IBUPROFEN 200 MG
600 TABLET ORAL ONCE
Refills: 0 | Status: COMPLETED | OUTPATIENT
Start: 2025-01-29 | End: 2025-01-29

## 2025-01-29 RX ADMIN — Medication 600 MILLIGRAM(S): at 11:00

## 2025-01-29 RX ADMIN — Medication 600 MILLIGRAM(S): at 10:17

## 2025-01-29 RX ADMIN — LIDOCAINE 1 PATCH: 50 OINTMENT TOPICAL at 10:17

## 2025-01-29 NOTE — ED PROVIDER NOTE - PATIENT PORTAL LINK FT
You can access the FollowMyHealth Patient Portal offered by [facility] by registering at the following website: [email or web address]. By joining [employer]’s FollowMyHealth portal, you will also be able to view your health information using other applications (apps) compatible with our system.

## 2025-01-29 NOTE — ED ADULT TRIAGE NOTE - CHIEF COMPLAINT QUOTE
R shoulder pain since yesterday. denies fall/trauma but may have injured it during [employer] class yesterday. AAOx3. ambulatory. denies cp, sob, numbness/tingling, weakness.

## 2025-01-29 NOTE — ED PROVIDER NOTE - NSFOLLOWUPINSTRUCTIONS_ED_ALL_ED_FT
Thank you for visiting [facility] Emergency Department.      We saw you today for shoulder pain.    PAIN CONTROL:   You may take ibuprofen (Motrin, Advil) 600 mg (3 regular tablets) every 6 hours as needed for pain.  Please take with food.  Stop taking if you develop abdominal pain, dark/ bloody stools.  Do not mix with other NSAIDS (ie. Naproxen, Aleve, Celecoxib).  You may also take acetaminophen (Tylenol) 650-975mg (2-3 regular tablets) or 500-1000mg (1-2 extra strength tablets) every 6 hours as needed for pain.  Do not exceed 4000 mg in 1 day. These medications may be bought over the counter.    I recommend alternating the Ibuprofen and Tylenol so you are getting medications around the clock.  For example take the Ibuprofen, then 3 hours later take the Tylenol, then 3 hours later take the Ibuprofen, and repeat as needed.    You may take Robaxin as prescribed and only as needed for severe pain.  Do not drive or operate machinery while taking this medication.  This medication may make you feel sleepy and has the potential to be habit-forming or addictive.    Rest. Apply ice to affected area 20 minutes on, then 20 minutes off.  You may repeat throughout the day.    Please follow up with an orthopedist in 1 week for re-evaluation.   You may benefit from having an MRI of your shoulder.     Please know that no emergency visit is complete without follow-up with your primary care provider in 1 week.  Please bring copies of all discharge papers and results and show to your doctor.      Please continue taking all previous medications as instructed unless we discussed otherwise.     I appreciated your patience and hope you feel better soon.     Return to ER immediately if you develop fevers, chills, chest pain, shortness of breath, worsening and/or any concerning symptoms.

## 2025-01-29 NOTE — ED ADULT NURSE NOTE - OBJECTIVE STATEMENT
Pt A&Ox4 no pmhx presents to ED with complaints of right shoulder pain. Pt reports he was doing jiu jitsu yesterday when he felt his right shoulder pull. Reports limited ROM in right upper extremity d/t pain. Pt has full sensation in bilateral upper extremities. Denies fevers/chills, numbness/tingling, neck pain, chest pain, shortness of breath.

## 2025-01-29 NOTE — ED PROVIDER NOTE - CARE PROVIDER_API CALL
Han Evans  Orthopaedic Surgery  [address]  [place] [address]  Phone: [phone]  Fax: [phone]  Follow Up Time:

## 2025-01-29 NOTE — ED PROVIDER NOTE - OBJECTIVE STATEMENT
66 y/o male w/ no sig pmh p/w R shoulder pain s/p doing jiu jijordanau yesterday.  Felt shoulder pulled in weird direction then pain started.  Worse with movement.  Denies neck pain, numbness, tingling, weakness to ext.  Denies f/c, cp, sob.  Tried oxycodone with some relief yesterday.

## 2025-01-29 NOTE — ED PROVIDER NOTE - PHYSICAL EXAMINATION
CONSTITUTIONAL: Awake, alert.  Nontoxic, no acute distress.    HEAD: Normocephalic, atraumatic.    EYES: Conjunctivae clear without exudates or hemorrhage. Sclera is non-icteric.    ENT: Normal appearing external ears, nose, mucous membranes moist.    NECK: supple, trachea midline.    HEART:  Normal rate, regular rhythm.     LUNGS:  No acute respiratory distress.  Non-tachypneic and non-labored.      MUSCULOSKELETAL:  Normal appearing extremities without obvious deformity, rash, ecchymosis, erythema.  No swelling.  Warm. No focal tenderness.  Decreased ROM to R shoulder.  5/5  strength.  Sensation and motor function grossly intact.  Strong equal peripheral pulses b/l.   Cap refill < 2     SKIN: Skin in warm, dry and intact without rashes or lesions.  Appropriate color for ethnicity.    NEUROLOGICAL:  Patient is alert, oriented x person, place and time.    PSYCH: Appropriate mood and affect. Good judgment and insight.

## 2025-01-29 NOTE — ED PROVIDER NOTE - CLINICAL SUMMARY MEDICAL DECISION MAKING FREE TEXT BOX
66 y/o male w/ no sig pmh p/w R shoulder pain s/p doing jiu jitsu yesterday.  Felt shoulder pulled in weird direction then pain started.  Worse with movement.  Denies neck pain, numbness, tingling, weakness to ext.  Denies f/c, cp, sob.  Tried oxycodone with some relief yesterday.  Exam with decreased rom to R shoulder.  NVI  Suspect likely msk pain vs rotator cuff injury  Plan for xr, pain control, ortho referral for poss outpt MRI 66 y/o male w/ no sig pmh p/w R shoulder pain s/p doing jiu jitsu yesterday.  Felt shoulder pulled in weird direction then pain started.  Worse with movement.  Denies neck pain, numbness, tingling, weakness to ext.  Denies f/c, cp, sob.  Tried oxycodone with some relief yesterday.  Exam with decreased rom to R shoulder.  NVI  Suspect likely msk pain vs rotator cuff injury  Plan for xr, pain control, ortho referral for poss outpt MRI  --  wet read xr neg for acute fx

## 2025-01-29 NOTE — ED ADULT NURSE NOTE - NSFALLUNIVINTERV_ED_ALL_ED
Bed/Stretcher in lowest position, wheels locked, appropriate side rails in place/Call bell, personal items and telephone in reach/Instruct patient to call for assistance before getting out of bed/chair/stretcher/Non-slip footwear applied when patient is off stretcher/[place] to call system/Physically safe environment - no spills, clutter or unnecessary equipment/Purposeful proactive rounding/Room/bathroom lighting operational, light cord in reach

## 2025-01-29 NOTE — ED ADULT NURSE NOTE - CHIEF COMPLAINT QUOTE
R shoulder pain since yesterday. denies fall/trauma but may have injured it during [employer] class yesterday. AAOx3. ambulatory. denies cp, sob, numbness/tingling, weakness.

## 2025-01-31 DIAGNOSIS — M25.511 PAIN IN RIGHT SHOULDER: ICD-10-CM
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
## 2025-01-31 DIAGNOSIS — Z88.0 ALLERGY STATUS TO PENICILLIN: ICD-10-CM
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
## 2025-02-10 ENCOUNTER — APPOINTMENT (OUTPATIENT)
Dept: ORTHOPEDIC SURGERY | Facility: CLINIC | Age: 65
End: 2025-02-10
Payer: MEDICAID

## 2025-02-10 DIAGNOSIS — S46.011A STRAIN OF MUSCLE(S) AND TENDON(S) OF THE ROTATOR CUFF OF RIGHT SHOULDER, INITIAL ENCOUNTER: ICD-10-CM

## 2025-02-10 PROCEDURE — 99213 OFFICE O/P EST LOW 20 MIN: CPT

## 2025-02-10 NOTE — ED PROVIDER NOTE - PATIENT PORTAL LINK FT
You can access the FollowMyHealth Patient Portal offered by [facility] by registering at the following website: [email or web address]. By joining [employer]’s FollowMyHealth portal, you will also be able to view your health information using other applications (apps) compatible with our system. Patient

## 2025-02-22 ENCOUNTER — APPOINTMENT (OUTPATIENT)
Dept: MRI IMAGING | Facility: CLINIC | Age: 65
End: 2025-02-22

## 2025-02-22 ENCOUNTER — OUTPATIENT (OUTPATIENT)
Dept: OUTPATIENT SERVICES | Facility: HOSPITAL | Age: 65
LOS: 1 days | End: 2025-02-22

## 2025-02-22 DIAGNOSIS — S86.812A STRAIN OF OTHER MUSCLE(S) AND TENDON(S) AT LOWER LEG LEVEL, LEFT LEG, INITIAL ENCOUNTER: Chronic | ICD-10-CM

## 2025-02-22 PROCEDURE — 73221 MRI JOINT UPR EXTREM W/O DYE: CPT | Mod: 26,RT

## 2025-02-24 ENCOUNTER — APPOINTMENT (OUTPATIENT)
Dept: ORTHOPEDIC SURGERY | Facility: CLINIC | Age: 65
End: 2025-02-24
Payer: MEDICAID

## 2025-02-24 VITALS
WEIGHT: 215 LBS | SYSTOLIC BLOOD PRESSURE: 124 MMHG | RESPIRATION RATE: 18 BRPM | HEIGHT: 72 IN | HEART RATE: 61 BPM | DIASTOLIC BLOOD PRESSURE: 59 MMHG | BODY MASS INDEX: 29.12 KG/M2

## 2025-02-24 DIAGNOSIS — S46.011A STRAIN OF MUSCLE(S) AND TENDON(S) OF THE ROTATOR CUFF OF RIGHT SHOULDER, INITIAL ENCOUNTER: ICD-10-CM

## 2025-02-24 DIAGNOSIS — M67.813 OTHER SPECIFIED DISORDERS OF TENDON, RIGHT SHOULDER: ICD-10-CM

## 2025-02-24 PROCEDURE — 99214 OFFICE O/P EST MOD 30 MIN: CPT

## 2025-03-01 ENCOUNTER — NON-APPOINTMENT (OUTPATIENT)
Age: 65
End: 2025-03-01

## 2025-03-17 ENCOUNTER — NON-APPOINTMENT (OUTPATIENT)
Age: 65
End: 2025-03-17

## 2025-03-17 RX ORDER — IBUPROFEN 600 MG/1
600 TABLET, FILM COATED ORAL 3 TIMES DAILY
Qty: 60 | Refills: 1 | Status: ACTIVE | COMMUNITY
Start: 2025-03-17 | End: 1900-01-01

## 2025-03-17 RX ORDER — HYDROCODONE BITARTRATE AND ACETAMINOPHEN 5; 325 MG/1; MG/1
5-325 TABLET ORAL
Qty: 25 | Refills: 0 | Status: ACTIVE | COMMUNITY
Start: 2025-03-17 | End: 1900-01-01

## 2025-03-17 RX ORDER — DOCUSATE SODIUM 100 MG/1
100 CAPSULE, LIQUID FILLED ORAL
Qty: 10 | Refills: 0 | Status: ACTIVE | COMMUNITY
Start: 2025-03-17 | End: 1900-01-01

## 2025-03-20 ENCOUNTER — APPOINTMENT (OUTPATIENT)
Age: 65
End: 2025-03-20

## 2025-03-20 PROCEDURE — 29823 SHO ARTHRS SRG XTNSV DBRDMT: CPT | Mod: RT

## 2025-03-20 PROCEDURE — 23430 REPAIR BICEPS TENDON: CPT | Mod: RT

## 2025-03-20 PROCEDURE — 29827 SHO ARTHRS SRG RT8TR CUF RPR: CPT | Mod: RT

## 2025-03-31 ENCOUNTER — APPOINTMENT (OUTPATIENT)
Dept: ORTHOPEDIC SURGERY | Facility: CLINIC | Age: 65
End: 2025-03-31
Payer: MEDICAID

## 2025-03-31 VITALS — RESPIRATION RATE: 18 BRPM | HEIGHT: 72 IN | BODY MASS INDEX: 29.12 KG/M2 | WEIGHT: 215 LBS

## 2025-03-31 DIAGNOSIS — M67.813 OTHER SPECIFIED DISORDERS OF TENDON, RIGHT SHOULDER: ICD-10-CM

## 2025-03-31 DIAGNOSIS — S46.011A STRAIN OF MUSCLE(S) AND TENDON(S) OF THE ROTATOR CUFF OF RIGHT SHOULDER, INITIAL ENCOUNTER: ICD-10-CM

## 2025-03-31 PROCEDURE — 99024 POSTOP FOLLOW-UP VISIT: CPT

## 2025-05-01 ENCOUNTER — NON-APPOINTMENT (OUTPATIENT)
Age: 65
End: 2025-05-01

## 2025-05-05 ENCOUNTER — APPOINTMENT (OUTPATIENT)
Dept: ORTHOPEDIC SURGERY | Facility: CLINIC | Age: 65
End: 2025-05-05
Payer: MEDICAID

## 2025-05-05 VITALS — RESPIRATION RATE: 18 BRPM | BODY MASS INDEX: 29.12 KG/M2 | HEIGHT: 72 IN | WEIGHT: 215 LBS

## 2025-05-05 DIAGNOSIS — M67.813 OTHER SPECIFIED DISORDERS OF TENDON, RIGHT SHOULDER: ICD-10-CM

## 2025-05-05 DIAGNOSIS — S46.011A STRAIN OF MUSCLE(S) AND TENDON(S) OF THE ROTATOR CUFF OF RIGHT SHOULDER, INITIAL ENCOUNTER: ICD-10-CM

## 2025-05-05 PROCEDURE — 99024 POSTOP FOLLOW-UP VISIT: CPT

## 2025-06-06 ENCOUNTER — NON-APPOINTMENT (OUTPATIENT)
Age: 65
End: 2025-06-06

## 2025-06-18 ENCOUNTER — APPOINTMENT (OUTPATIENT)
Dept: ORTHOPEDIC SURGERY | Facility: CLINIC | Age: 65
End: 2025-06-18
Payer: MEDICAID

## 2025-06-18 VITALS — BODY MASS INDEX: 29.12 KG/M2 | WEIGHT: 215 LBS | RESPIRATION RATE: 18 BRPM | HEIGHT: 72 IN

## 2025-06-18 PROCEDURE — 99024 POSTOP FOLLOW-UP VISIT: CPT

## 2025-08-19 ENCOUNTER — NON-APPOINTMENT (OUTPATIENT)
Age: 65
End: 2025-08-19

## 2025-08-20 ENCOUNTER — APPOINTMENT (OUTPATIENT)
Dept: ORTHOPEDIC SURGERY | Facility: CLINIC | Age: 65
End: 2025-08-20
Payer: MEDICAID

## 2025-08-20 VITALS — RESPIRATION RATE: 18 BRPM | BODY MASS INDEX: 29.12 KG/M2 | HEIGHT: 72 IN | WEIGHT: 215 LBS

## 2025-08-20 DIAGNOSIS — S46.011A STRAIN OF MUSCLE(S) AND TENDON(S) OF THE ROTATOR CUFF OF RIGHT SHOULDER, INITIAL ENCOUNTER: ICD-10-CM

## 2025-08-20 DIAGNOSIS — M67.813 OTHER SPECIFIED DISORDERS OF TENDON, RIGHT SHOULDER: ICD-10-CM

## 2025-08-20 PROCEDURE — 99213 OFFICE O/P EST LOW 20 MIN: CPT

## 2025-09-02 ENCOUNTER — NON-APPOINTMENT (OUTPATIENT)
Age: 65
End: 2025-09-02